# Patient Record
Sex: FEMALE | Race: WHITE | NOT HISPANIC OR LATINO | Employment: OTHER | URBAN - METROPOLITAN AREA
[De-identification: names, ages, dates, MRNs, and addresses within clinical notes are randomized per-mention and may not be internally consistent; named-entity substitution may affect disease eponyms.]

---

## 2017-05-08 ENCOUNTER — APPOINTMENT (OUTPATIENT)
Dept: WOMENS IMAGING | Facility: HOSPITAL | Age: 67
End: 2017-05-08

## 2017-05-08 PROCEDURE — 77063 BREAST TOMOSYNTHESIS BI: CPT | Performed by: RADIOLOGY

## 2017-05-08 PROCEDURE — G0202 SCR MAMMO BI INCL CAD: HCPCS | Performed by: RADIOLOGY

## 2019-05-24 ENCOUNTER — TELEPHONE (OUTPATIENT)
Dept: INTERNAL MEDICINE | Facility: CLINIC | Age: 69
End: 2019-05-24

## 2019-05-24 DIAGNOSIS — Z00.00 HEALTH CARE MAINTENANCE: Primary | ICD-10-CM

## 2019-05-24 DIAGNOSIS — R73.03 PREDIABETES: ICD-10-CM

## 2019-05-28 LAB
ALBUMIN SERPL-MCNC: 4.5 G/DL (ref 3.5–5.2)
ALBUMIN/GLOB SERPL: 2.1 G/DL
ALP SERPL-CCNC: 59 U/L (ref 39–117)
ALT SERPL-CCNC: 30 U/L (ref 1–33)
AST SERPL-CCNC: 27 U/L (ref 1–32)
BASOPHILS # BLD AUTO: 0.07 10*3/MM3 (ref 0–0.2)
BASOPHILS NFR BLD AUTO: 0.8 % (ref 0–1.5)
BILIRUB SERPL-MCNC: 0.3 MG/DL (ref 0.2–1.2)
BUN SERPL-MCNC: 15 MG/DL (ref 8–23)
BUN/CREAT SERPL: 15.5 (ref 7–25)
CALCIUM SERPL-MCNC: 10.2 MG/DL (ref 8.6–10.5)
CHLORIDE SERPL-SCNC: 102 MMOL/L (ref 98–107)
CHOLEST SERPL-MCNC: 105 MG/DL (ref 0–200)
CO2 SERPL-SCNC: 25.3 MMOL/L (ref 22–29)
CREAT SERPL-MCNC: 0.97 MG/DL (ref 0.57–1)
EOSINOPHIL # BLD AUTO: 0.31 10*3/MM3 (ref 0–0.4)
EOSINOPHIL NFR BLD AUTO: 3.6 % (ref 0.3–6.2)
ERYTHROCYTE [DISTWIDTH] IN BLOOD BY AUTOMATED COUNT: 17.1 % (ref 12.3–15.4)
GLOBULIN SER CALC-MCNC: 2.1 GM/DL
GLUCOSE SERPL-MCNC: 155 MG/DL (ref 65–99)
HBA1C MFR BLD: 8.6 % (ref 4.8–5.6)
HCT VFR BLD AUTO: 37.2 % (ref 34–46.6)
HDLC SERPL-MCNC: 36 MG/DL (ref 40–60)
HGB BLD-MCNC: 10.6 G/DL (ref 12–15.9)
IMM GRANULOCYTES # BLD AUTO: 0.04 10*3/MM3 (ref 0–0.05)
IMM GRANULOCYTES NFR BLD AUTO: 0.5 % (ref 0–0.5)
LDLC SERPL CALC-MCNC: 34 MG/DL (ref 0–100)
LYMPHOCYTES # BLD AUTO: 1.77 10*3/MM3 (ref 0.7–3.1)
LYMPHOCYTES NFR BLD AUTO: 20.7 % (ref 19.6–45.3)
MCH RBC QN AUTO: 26.9 PG (ref 26.6–33)
MCHC RBC AUTO-ENTMCNC: 28.5 G/DL (ref 31.5–35.7)
MCV RBC AUTO: 94.4 FL (ref 79–97)
MONOCYTES # BLD AUTO: 0.59 10*3/MM3 (ref 0.1–0.9)
MONOCYTES NFR BLD AUTO: 6.9 % (ref 5–12)
NEUTROPHILS # BLD AUTO: 5.79 10*3/MM3 (ref 1.7–7)
NEUTROPHILS NFR BLD AUTO: 67.5 % (ref 42.7–76)
NRBC BLD AUTO-RTO: 0 /100 WBC (ref 0–0.2)
PLATELET # BLD AUTO: 292 10*3/MM3 (ref 140–450)
POTASSIUM SERPL-SCNC: 4.8 MMOL/L (ref 3.5–5.2)
PROT SERPL-MCNC: 6.6 G/DL (ref 6–8.5)
RBC # BLD AUTO: 3.94 10*6/MM3 (ref 3.77–5.28)
SODIUM SERPL-SCNC: 143 MMOL/L (ref 136–145)
TRIGL SERPL-MCNC: 176 MG/DL (ref 0–150)
TSH SERPL DL<=0.005 MIU/L-ACNC: 1.78 MIU/ML (ref 0.27–4.2)
VLDLC SERPL CALC-MCNC: 35.2 MG/DL
WBC # BLD AUTO: 8.57 10*3/MM3 (ref 3.4–10.8)

## 2019-05-29 LAB
FERRITIN SERPL-MCNC: 81.6 NG/ML (ref 13–150)
Lab: NORMAL
VIT B12 SERPL-MCNC: 464 PG/ML (ref 211–946)
WRITTEN AUTHORIZATION: NORMAL

## 2019-06-04 ENCOUNTER — OFFICE VISIT (OUTPATIENT)
Dept: INTERNAL MEDICINE | Facility: CLINIC | Age: 69
End: 2019-06-04

## 2019-06-04 VITALS
WEIGHT: 273.6 LBS | RESPIRATION RATE: 20 BRPM | HEART RATE: 75 BPM | BODY MASS INDEX: 46.71 KG/M2 | OXYGEN SATURATION: 99 % | HEIGHT: 64 IN | DIASTOLIC BLOOD PRESSURE: 80 MMHG | TEMPERATURE: 98.1 F | SYSTOLIC BLOOD PRESSURE: 114 MMHG

## 2019-06-04 DIAGNOSIS — I10 ESSENTIAL HYPERTENSION: Primary | ICD-10-CM

## 2019-06-04 DIAGNOSIS — E53.8 B12 DEFICIENCY: ICD-10-CM

## 2019-06-04 DIAGNOSIS — E03.8 OTHER SPECIFIED HYPOTHYROIDISM: ICD-10-CM

## 2019-06-04 DIAGNOSIS — D64.9 ANEMIA, UNSPECIFIED TYPE: ICD-10-CM

## 2019-06-04 DIAGNOSIS — E78.5 HYPERLIPIDEMIA, UNSPECIFIED HYPERLIPIDEMIA TYPE: ICD-10-CM

## 2019-06-04 PROCEDURE — 99214 OFFICE O/P EST MOD 30 MIN: CPT | Performed by: INTERNAL MEDICINE

## 2019-06-04 RX ORDER — BENAZEPRIL HYDROCHLORIDE 40 MG/1
40 TABLET, FILM COATED ORAL DAILY
COMMUNITY
End: 2020-06-09 | Stop reason: SDUPTHER

## 2019-06-04 RX ORDER — AMLODIPINE BESYLATE 10 MG/1
10 TABLET ORAL DAILY
COMMUNITY
End: 2020-07-01 | Stop reason: SDUPTHER

## 2019-06-04 RX ORDER — ATORVASTATIN CALCIUM 40 MG/1
40 TABLET, FILM COATED ORAL DAILY
COMMUNITY
End: 2020-03-18

## 2019-06-04 RX ORDER — LANOLIN ALCOHOL/MO/W.PET/CERES
1000 CREAM (GRAM) TOPICAL DAILY
COMMUNITY

## 2019-06-04 RX ORDER — ASPIRIN 81 MG/1
81 TABLET, CHEWABLE ORAL DAILY
COMMUNITY
End: 2021-12-06

## 2019-06-04 RX ORDER — LEVOTHYROXINE SODIUM 112 UG/1
112 TABLET ORAL DAILY
COMMUNITY
End: 2019-12-27 | Stop reason: SDUPTHER

## 2019-06-04 RX ORDER — GLIPIZIDE 10 MG/1
10 TABLET, FILM COATED, EXTENDED RELEASE ORAL 2 TIMES DAILY
COMMUNITY
End: 2021-06-01

## 2019-06-04 RX ORDER — HYDROCHLOROTHIAZIDE 25 MG/1
25 TABLET ORAL DAILY
COMMUNITY
End: 2020-04-09 | Stop reason: SDUPTHER

## 2019-06-04 RX ORDER — ALLOPURINOL 300 MG/1
300 TABLET ORAL DAILY
COMMUNITY
End: 2020-04-09 | Stop reason: SDUPTHER

## 2019-06-04 RX ORDER — LEVOTHYROXINE SODIUM 0.2 MG/1
200 TABLET ORAL DAILY
COMMUNITY
End: 2019-12-27 | Stop reason: SDUPTHER

## 2019-06-04 RX ORDER — METOPROLOL SUCCINATE 50 MG/1
50 TABLET, EXTENDED RELEASE ORAL DAILY
COMMUNITY
End: 2019-06-05 | Stop reason: CLARIF

## 2019-06-04 RX ORDER — OLMESARTAN MEDOXOMIL 40 MG/1
40 TABLET ORAL DAILY
COMMUNITY
End: 2020-07-01 | Stop reason: SDUPTHER

## 2019-06-04 RX ORDER — FOLIC ACID 1 MG/1
1 TABLET ORAL DAILY
COMMUNITY
End: 2019-12-09 | Stop reason: SDUPTHER

## 2019-06-04 NOTE — PROGRESS NOTES
Subjective        Chief Complaint   Patient presents with   • Follow-up     6 month fup lab review    • Anemia   • Hypertension           PHQ-2 Depression Screening  Little interest or pleasure in doing things? 0   Feeling down, depressed, or hopeless? 0   PHQ-2 Total Score 0       Diabetic eye exam 06/03/2019    Lynnette Wright is a 68 y.o. female who presents for    Patient Active Problem List   Diagnosis   • Other specified hypothyroidism   • Anemia   • B12 deficiency   • Hypertension   • Hyperlipidemia       History of Present Illness     She had her a1c drawn last Friday but she does not know the result. She has been checking her BP and it was 121/79 and 118/81. She denies gout, chest pain, dyspnea, melena, hematochezia or abdominal pain. She bought an elliptical and she uses it 3-4 times per week. She has been out of Meloxicam since April; she took it for her knees and it did help.  Allergies   Allergen Reactions   • Contrast Dye Hives       Current Outpatient Medications on File Prior to Visit   Medication Sig Dispense Refill   • allopurinol (ZYLOPRIM) 300 MG tablet Take 300 mg by mouth Daily.     • amLODIPine (NORVASC) 10 MG tablet Take 10 mg by mouth Daily.     • aspirin 81 MG chewable tablet Chew 81 mg Daily.     • atorvastatin (LIPITOR) 40 MG tablet Take 40 mg by mouth Daily.     • benazepril (LOTENSIN) 40 MG tablet Take 40 mg by mouth Daily.     • folic acid (FOLVITE) 1 MG tablet Take 1 mg by mouth Daily.     • glipiZIDE (GLUCOTROL XL) 10 MG 24 hr tablet Take 10 mg by mouth 2 (Two) Times a Day.     • glucose blood test strip 1 each by Other route As Needed. Use as instructed     • hydrochlorothiazide (HYDRODIURIL) 25 MG tablet Take 25 mg by mouth Daily.     • Insulin Glargine (LANTUS SOLOSTAR) 100 UNIT/ML injection pen Inject 52 Units under the skin into the appropriate area as directed Daily.     • Insulin Pen Needle 31G X 5 MM misc      • Lancets 30G misc      • levothyroxine (SYNTHROID, LEVOTHROID) 112  MCG tablet Take 112 mcg by mouth Daily.     • levothyroxine (SYNTHROID, LEVOTHROID) 200 MCG tablet Take 200 mcg by mouth Daily.     • Liraglutide (VICTOZA) 18 MG/3ML solution pen-injector injection Inject 1.2 mg under the skin into the appropriate area as directed Daily.     • metFORMIN (GLUCOPHAGE) 1000 MG tablet Take 1,000 mg by mouth 2 (Two) Times a Day With Meals.     • metoprolol succinate XL (TOPROL-XL) 50 MG 24 hr tablet Take 50 mg by mouth Daily.     • olmesartan (BENICAR) 40 MG tablet Take 40 mg by mouth Daily.     • TURMERIC PO Take  by mouth.     • vitamin B-12 (CYANOCOBALAMIN) 1000 MCG tablet Take 1,000 mcg by mouth Daily.       No current facility-administered medications on file prior to visit.        Past Medical History:   Diagnosis Date   • Allergic    • Anemia    • Arthritis    • B12 deficiency    • Cataract    • Diabetes mellitus (CMS/HCC)    • Diverticulosis    • Fatty liver    • Gout    • Hemangioma    • Hyperlipidemia    • Hypertension    • Lipoma    • LVH (left ventricular hypertrophy)    • Obesity    • ELLA (obstructive sleep apnea)        Past Surgical History:   Procedure Laterality Date   • BREAST BIOPSY     • BREAST SURGERY     • CATARACT EXTRACTION     • CHOLECYSTECTOMY     • COLONOSCOPY  2013   • HYSTERECTOMY     • THYROID SURGERY      entire removal       Family History   Problem Relation Age of Onset   • Hypertension Mother    • Other Mother         fall   • Hypertension Father    • Bone cancer Father    • Lung cancer Father    • Diabetes Brother    • Cancer Brother         sinus   • Hypertension Maternal Grandmother    • Stroke Maternal Grandmother    • Dementia Paternal Grandmother    • Alzheimer's disease Paternal Grandmother    • Stroke Paternal Grandfather        Social History     Socioeconomic History   • Marital status: Unknown     Spouse name: Not on file   • Number of children: Not on file   • Years of education: Not on file   • Highest education level: Not on file   Tobacco  "Use   • Smoking status: Never Smoker   • Smokeless tobacco: Never Used   Substance and Sexual Activity   • Alcohol use: Yes     Frequency: 2-4 times a month     Drinks per session: 1 or 2   • Drug use: No   • Sexual activity: Defer           The following portions of the patient's history were reviewed and updated as appropriate: problem list, allergies, current medications, past medical history, past family history, past social history and past surgical history.    Review of Systems   Respiratory: Negative for shortness of breath.    Cardiovascular: Negative for chest pain.   Gastrointestinal: Negative for abdominal pain and blood in stool.       Immunization History   Administered Date(s) Administered   • Flu Vaccine High Dose PF 65YR+ 10/25/2018   • Hepatitis A 12/12/2017, 06/11/2018   • Hepatitis B 12/12/2017, 06/11/2018   • Pneumococcal Conjugate 13-Valent (PCV13) 05/18/2015   • Pneumococcal Polysaccharide (PPSV23) 01/01/2011   • Tdap 01/01/2013   • Zostavax 01/01/2012       Objective   Vitals:    06/04/19 1111   BP: 114/80   Pulse: 75   Resp: 20   Temp: 98.1 °F (36.7 °C)   TempSrc: Oral   SpO2: 99%   Weight: 124 kg (273 lb 9.6 oz)   Height: 162.6 cm (64\")     Physical Exam   Constitutional: She appears well-developed and well-nourished.   HENT:   Head: Normocephalic and atraumatic.   Cardiovascular: Normal rate, regular rhythm, S1 normal, S2 normal and normal heart sounds.   Pulmonary/Chest: Effort normal and breath sounds normal.   Abdominal: Soft. She exhibits no mass. There is no tenderness.   Neurological: She is alert.   Skin: Skin is warm.   Psychiatric: She has a normal mood and affect.   Vitals reviewed.      Procedures    Assessment/Plan   Lynnette was seen today for follow-up, anemia and hypertension.    Diagnoses and all orders for this visit:    Essential hypertension    Hyperlipidemia, unspecified hyperlipidemia type    B12 deficiency    Other specified hypothyroidism  -     TSH; Future    Anemia, " unspecified type  -     Iron Profile  -     Transferrin  -     Reticulocytes  -     Peripheral Blood Smear  -     Protein Electrophoresis, Random Urine - Urine, Clean Catch  -     Protein Electrophoresis, Total  -     CBC & Differential; Future             She will check with Chris about her third hep B. She is to get her cscope this summer. She is having her MMG tomorrow. I am going to get more blood today to eval her anemia; she has it in the past but her hgb was in the 12s last year. TSH, LDL and BP are at goal. She sees romel for diabetes.    Return in about 4 months (around 10/4/2019), or 30 minutes.

## 2019-06-05 ENCOUNTER — APPOINTMENT (OUTPATIENT)
Dept: WOMENS IMAGING | Facility: HOSPITAL | Age: 69
End: 2019-06-05

## 2019-06-05 ENCOUNTER — TELEPHONE (OUTPATIENT)
Dept: INTERNAL MEDICINE | Facility: CLINIC | Age: 69
End: 2019-06-05

## 2019-06-05 PROCEDURE — 77067 SCR MAMMO BI INCL CAD: CPT | Performed by: RADIOLOGY

## 2019-06-05 PROCEDURE — 77063 BREAST TOMOSYNTHESIS BI: CPT | Performed by: RADIOLOGY

## 2019-06-05 RX ORDER — METOPROLOL SUCCINATE 50 MG/1
50 TABLET, EXTENDED RELEASE ORAL DAILY
Qty: 90 TABLET | Refills: 0 | Status: SHIPPED | OUTPATIENT
Start: 2019-06-05 | End: 2020-03-18

## 2019-06-05 RX ORDER — METOPROLOL TARTRATE 50 MG/1
50 TABLET, FILM COATED ORAL 2 TIMES DAILY
Qty: 180 TABLET | Refills: 1 | Status: SHIPPED | OUTPATIENT
Start: 2019-06-05 | End: 2019-06-05 | Stop reason: CLARIF

## 2019-06-05 RX ORDER — METOPROLOL TARTRATE 50 MG/1
50 TABLET, FILM COATED ORAL 2 TIMES DAILY
COMMUNITY
End: 2019-06-05 | Stop reason: SDUPTHER

## 2019-06-05 NOTE — TELEPHONE ENCOUNTER
Confirm with her which metoprolol she takes and how she takes it since refill request different than what is in chart

## 2019-06-06 NOTE — TELEPHONE ENCOUNTER
Will you please clarify as there are two orders sent in. One was for bid and one for qd. Please ask pt and fix at pharmacy/mail order. Also find out which formulation of metoprolol

## 2019-06-07 ENCOUNTER — TELEPHONE (OUTPATIENT)
Dept: INTERNAL MEDICINE | Facility: CLINIC | Age: 69
End: 2019-06-07

## 2019-06-07 DIAGNOSIS — R92.8 MAMMOGRAM ABNORMAL: Primary | ICD-10-CM

## 2019-06-07 LAB
ALBUMIN SERPL ELPH-MCNC: 3.5 G/DL (ref 2.9–4.4)
ALBUMIN/GLOB SERPL: 1 {RATIO} (ref 0.7–1.7)
ALPHA1 GLOB SERPL ELPH-MCNC: 0.3 G/DL (ref 0–0.4)
ALPHA2 GLOB SERPL ELPH-MCNC: 1.1 G/DL (ref 0.4–1)
B-GLOBULIN SERPL ELPH-MCNC: 1.1 G/DL (ref 0.7–1.3)
BASOPHILS # BLD AUTO: 0.1 X10E3/UL (ref 0–0.2)
BASOPHILS NFR BLD AUTO: 1 %
EOSINOPHIL # BLD AUTO: 0.3 X10E3/UL (ref 0–0.4)
EOSINOPHIL NFR BLD AUTO: 3 %
ERYTHROCYTE [DISTWIDTH] IN BLOOD BY AUTOMATED COUNT: 17.3 % (ref 12.3–15.4)
GAMMA GLOB SERPL ELPH-MCNC: 1.1 G/DL (ref 0.4–1.8)
GLOBULIN SER CALC-MCNC: 3.5 G/DL (ref 2.2–3.9)
HCT VFR BLD AUTO: 36.4 % (ref 34–46.6)
HGB BLD-MCNC: 11.5 G/DL (ref 11.1–15.9)
IMM GRANULOCYTES # BLD AUTO: 0 X10E3/UL (ref 0–0.1)
IMM GRANULOCYTES NFR BLD AUTO: 0 %
IRON SATN MFR SERPL: 12 % (ref 20–50)
IRON SERPL-MCNC: 50 MCG/DL (ref 37–145)
LABORATORY COMMENT REPORT: ABNORMAL
LYMPHOCYTES # BLD AUTO: 2.6 X10E3/UL (ref 0.7–3.1)
LYMPHOCYTES NFR BLD AUTO: 26 %
M PROTEIN SERPL ELPH-MCNC: ABNORMAL G/DL
MCH RBC QN AUTO: 27.5 PG (ref 26.6–33)
MCHC RBC AUTO-ENTMCNC: 31.6 G/DL (ref 31.5–35.7)
MCV RBC AUTO: 87 FL (ref 79–97)
MONOCYTES # BLD AUTO: 0.7 X10E3/UL (ref 0.1–0.9)
MONOCYTES NFR BLD AUTO: 7 %
NEUTROPHILS # BLD AUTO: 6.4 X10E3/UL (ref 1.4–7)
NEUTROPHILS NFR BLD AUTO: 63 %
PATH INTERP BLD-IMP: ABNORMAL
PATHOLOGIST NAME: ABNORMAL
PLATELET # BLD AUTO: 358 X10E3/UL (ref 150–450)
PROT SERPL-MCNC: 7 G/DL (ref 6–8.5)
RBC # BLD AUTO: 4.18 X10E6/UL (ref 3.77–5.28)
RETICS/RBC NFR AUTO: 2.03 % (ref 0.7–1.9)
TIBC SERPL-MCNC: 415 MCG/DL
TRANSFERRIN SERPL-MCNC: 289 MG/DL (ref 200–370)
UIBC SERPL-MCNC: 365 MCG/DL (ref 112–346)
WBC # BLD AUTO: 10 X10E3/UL (ref 3.4–10.8)

## 2019-06-07 NOTE — TELEPHONE ENCOUNTER
She has new calcifications in her left breast. Schedule magnification mammogram at [facility] as that is where she had MMG yesterday. Thank you

## 2019-06-20 ENCOUNTER — APPOINTMENT (OUTPATIENT)
Dept: WOMENS IMAGING | Facility: HOSPITAL | Age: 69
End: 2019-06-20

## 2019-06-20 PROCEDURE — 77065 DX MAMMO INCL CAD UNI: CPT | Performed by: RADIOLOGY

## 2019-06-20 PROCEDURE — MDREVIEWSP: Performed by: RADIOLOGY

## 2019-06-20 PROCEDURE — G0279 TOMOSYNTHESIS, MAMMO: HCPCS | Performed by: RADIOLOGY

## 2019-06-21 DIAGNOSIS — R92.8 ABNORMAL MAMMOGRAM OF LEFT BREAST: Primary | ICD-10-CM

## 2019-06-26 ENCOUNTER — APPOINTMENT (OUTPATIENT)
Dept: WOMENS IMAGING | Facility: HOSPITAL | Age: 69
End: 2019-06-26

## 2019-06-26 PROCEDURE — 19081 BX BREAST 1ST LESION STRTCTC: CPT | Performed by: RADIOLOGY

## 2019-06-28 ENCOUNTER — TELEPHONE (OUTPATIENT)
Dept: INTERNAL MEDICINE | Facility: CLINIC | Age: 69
End: 2019-06-28

## 2019-06-28 ENCOUNTER — RESULTS ENCOUNTER (OUTPATIENT)
Dept: INTERNAL MEDICINE | Facility: CLINIC | Age: 69
End: 2019-06-28

## 2019-06-28 DIAGNOSIS — D64.9 ANEMIA, UNSPECIFIED TYPE: ICD-10-CM

## 2019-06-28 NOTE — TELEPHONE ENCOUNTER
Tell her I reviewed biopsy with path last night and she has a benign fibroadenoma in her left breast. NO evidence of cancer

## 2019-07-01 ENCOUNTER — TELEPHONE (OUTPATIENT)
Dept: INTERNAL MEDICINE | Facility: CLINIC | Age: 69
End: 2019-07-01

## 2019-07-01 NOTE — TELEPHONE ENCOUNTER
Meenakshi with [facility] called to let us know patient has already had a stereotactic biopsy done on 06/26/19 at [facility] and they will cancel the one scheduled for 07/09/19.

## 2019-07-02 DIAGNOSIS — R92.8 ABNORMAL MAMMOGRAM: Primary | ICD-10-CM

## 2019-07-02 DIAGNOSIS — R92.8 FOLLOW-UP EXAMINATION OF ABNORMAL MAMMOGRAM: ICD-10-CM

## 2019-07-03 ENCOUNTER — ANESTHESIA EVENT (OUTPATIENT)
Dept: GASTROENTEROLOGY | Facility: HOSPITAL | Age: 69
End: 2019-07-03

## 2019-07-03 ENCOUNTER — HOSPITAL ENCOUNTER (OUTPATIENT)
Facility: HOSPITAL | Age: 69
Setting detail: HOSPITAL OUTPATIENT SURGERY
Discharge: HOME | End: 2019-07-03
Attending: SURGERY | Admitting: SURGERY

## 2019-07-03 ENCOUNTER — ANESTHESIA (OUTPATIENT)
Dept: GASTROENTEROLOGY | Facility: HOSPITAL | Age: 69
End: 2019-07-03

## 2019-07-03 VITALS
WEIGHT: 272.4 LBS | OXYGEN SATURATION: 96 % | SYSTOLIC BLOOD PRESSURE: 117 MMHG | HEART RATE: 78 BPM | DIASTOLIC BLOOD PRESSURE: 73 MMHG | BODY MASS INDEX: 46.51 KG/M2 | HEIGHT: 64 IN | RESPIRATION RATE: 16 BRPM | TEMPERATURE: 97.9 F

## 2019-07-03 DIAGNOSIS — D64.9 ANEMIA: ICD-10-CM

## 2019-07-03 LAB — GLUCOSE BLDC GLUCOMTR-MCNC: 152 MG/DL (ref 70–130)

## 2019-07-03 PROCEDURE — 25010000002 PROPOFOL 10 MG/ML EMULSION: Performed by: ANESTHESIOLOGY

## 2019-07-03 PROCEDURE — 82962 GLUCOSE BLOOD TEST: CPT

## 2019-07-03 PROCEDURE — 88305 TISSUE EXAM BY PATHOLOGIST: CPT | Performed by: SURGERY

## 2019-07-03 PROCEDURE — 25010000002 PROPOFOL 1000 MG/ML EMULSION: Performed by: ANESTHESIOLOGY

## 2019-07-03 RX ORDER — PROPOFOL 10 MG/ML
VIAL (ML) INTRAVENOUS AS NEEDED
Status: DISCONTINUED | OUTPATIENT
Start: 2019-07-03 | End: 2019-07-03

## 2019-07-03 RX ORDER — PROPOFOL 10 MG/ML
VIAL (ML) INTRAVENOUS AS NEEDED
Status: DISCONTINUED | OUTPATIENT
Start: 2019-07-03 | End: 2019-07-03 | Stop reason: SURG

## 2019-07-03 RX ORDER — SODIUM CHLORIDE, SODIUM LACTATE, POTASSIUM CHLORIDE, CALCIUM CHLORIDE 600; 310; 30; 20 MG/100ML; MG/100ML; MG/100ML; MG/100ML
1000 INJECTION, SOLUTION INTRAVENOUS CONTINUOUS
Status: DISCONTINUED | OUTPATIENT
Start: 2019-07-03 | End: 2019-07-03 | Stop reason: HOSPADM

## 2019-07-03 RX ADMIN — SODIUM CHLORIDE, POTASSIUM CHLORIDE, SODIUM LACTATE AND CALCIUM CHLORIDE 1000 ML: 600; 310; 30; 20 INJECTION, SOLUTION INTRAVENOUS at 12:16

## 2019-07-03 RX ADMIN — PROPOFOL 140 MCG/KG/MIN: 10 INJECTION, EMULSION INTRAVENOUS at 12:37

## 2019-07-03 RX ADMIN — SODIUM CHLORIDE, POTASSIUM CHLORIDE, SODIUM LACTATE AND CALCIUM CHLORIDE 1000 ML: 600; 310; 30; 20 INJECTION, SOLUTION INTRAVENOUS at 12:17

## 2019-07-03 RX ADMIN — PROPOFOL 100 MG: 10 INJECTION, EMULSION INTRAVENOUS at 12:37

## 2019-07-03 NOTE — ANESTHESIA POSTPROCEDURE EVALUATION
"Patient: Lynnette Wright    Procedure Summary     Date:  07/03/19 Room / Location:  [facility] ENDOSCOPY 10 /  [facility]    Anesthesia Start:  1234 Anesthesia Stop:  1259    Procedure:  COLONOSCOPY to cecum with biopsy / polypectomy (N/A ) Diagnosis:      Surgeon:  Royal Zuleta MD Provider:  Cary Encinas MD    Anesthesia Type:  MAC ASA Status:  3          Anesthesia Type: MAC  Last vitals  BP   (!) 89/46 (07/03/19 1300)   Temp   36.6 °C (97.9 °F) (07/03/19 1151)   Pulse   78 (07/03/19 1300)   Resp   14 (07/03/19 1300)     SpO2   96 % (07/03/19 1300)     Post Anesthesia Care and Evaluation    Patient location during evaluation: bedside  Patient participation: complete - patient participated  Level of consciousness: awake  Pain management: adequate  Airway patency: patent  Anesthetic complications: No anesthetic complications  PONV Status: none  Cardiovascular status: acceptable  Respiratory status: acceptable  Hydration status: acceptable    Comments: BP (!) 89/46 (BP Location: Right arm, Patient Position: Lying)   Pulse 78   Temp 36.6 °C (97.9 °F) (Oral)   Resp 14   Ht 162.6 cm (64\")   Wt 124 kg (272 lb 6.4 oz)   SpO2 96%   BMI 46.76 kg/m²         "

## 2019-07-03 NOTE — ANESTHESIA PREPROCEDURE EVALUATION
Anesthesia Evaluation     Patient summary reviewed   NPO Solid Status: > 8 hours  NPO Liquid Status: > 4 hours           Airway   Mallampati: III  TM distance: >3 FB  Dental      Pulmonary    (+) sleep apnea on CPAP,   Cardiovascular     Rhythm: regular  Rate: normal    (+) hypertension 2 medications or greater, valvular problems/murmurs, hyperlipidemia,       Neuro/Psych  GI/Hepatic/Renal/Endo    (+) morbid obesity,  diabetes mellitus using insulin, hypothyroidism,     Musculoskeletal     Abdominal    Substance History      OB/GYN          Other   (+) arthritis                     Anesthesia Plan    ASA 3     MAC   total IV anesthesia  Anesthetic plan, all risks, benefits, and alternatives have been provided, discussed and informed consent has been obtained with: patient.

## 2019-07-03 NOTE — H&P
"Subjective   Lynnette Wright is a 68 y.o. female who presents today for a screening colonoscopy.  On questioning, there are no GI complaints or problems.  Her primary has diagnosed some iron deficiency anemia.  Her  was diagnosed with colon cancer last year so she is \"on high alert.\"      Past Medical History:   Diagnosis Date   • Allergic    • Anemia    • Arthritis    • B12 deficiency    • Cataract    • Diabetes mellitus (CMS/HCC)    • Diverticulosis    • Fatty liver    • Gout    • Heart murmur    • Hemangioma    • Hyperlipidemia    • Hypertension    • Lipoma    • LVH (left ventricular hypertrophy)    • Obesity    • ELLA (obstructive sleep apnea)        PHYSICAL EXAM  Pt is in no distress.  Heart regular.  Chest clear.  Abdomen soft.  Rectal deferred to endoscopy.      Assessment/Plan      Plan a screening colonoscopy today.  Risks and benefits were discussed.  Patient is agreeable.  Final recommendations will follow depending on the results.    Royal Zuleta M.D.      "

## 2019-07-05 LAB
CYTO UR: NORMAL
LAB AP CASE REPORT: NORMAL
PATH REPORT.FINAL DX SPEC: NORMAL
PATH REPORT.GROSS SPEC: NORMAL

## 2019-07-09 ENCOUNTER — HOSPITAL ENCOUNTER (OUTPATIENT)
Dept: MAMMOGRAPHY | Facility: HOSPITAL | Age: 69
End: 2019-07-09

## 2019-10-02 ENCOUNTER — RESULTS ENCOUNTER (OUTPATIENT)
Dept: INTERNAL MEDICINE | Facility: CLINIC | Age: 69
End: 2019-10-02

## 2019-10-02 DIAGNOSIS — E03.8 OTHER SPECIFIED HYPOTHYROIDISM: ICD-10-CM

## 2019-10-07 ENCOUNTER — OFFICE VISIT (OUTPATIENT)
Dept: INTERNAL MEDICINE | Facility: CLINIC | Age: 69
End: 2019-10-07

## 2019-10-07 VITALS
DIASTOLIC BLOOD PRESSURE: 80 MMHG | HEIGHT: 64 IN | BODY MASS INDEX: 45.85 KG/M2 | SYSTOLIC BLOOD PRESSURE: 108 MMHG | WEIGHT: 268.6 LBS

## 2019-10-07 DIAGNOSIS — D64.9 ANEMIA, UNSPECIFIED TYPE: ICD-10-CM

## 2019-10-07 DIAGNOSIS — D36.9 TUBULAR ADENOMA: ICD-10-CM

## 2019-10-07 DIAGNOSIS — E78.5 HYPERLIPIDEMIA, UNSPECIFIED HYPERLIPIDEMIA TYPE: ICD-10-CM

## 2019-10-07 DIAGNOSIS — E66.01 MORBIDLY OBESE (HCC): ICD-10-CM

## 2019-10-07 DIAGNOSIS — E03.8 OTHER SPECIFIED HYPOTHYROIDISM: ICD-10-CM

## 2019-10-07 DIAGNOSIS — I10 ESSENTIAL HYPERTENSION: Primary | ICD-10-CM

## 2019-10-07 LAB
BASOPHILS # BLD AUTO: 0.09 10*3/MM3 (ref 0–0.2)
BASOPHILS NFR BLD AUTO: 0.7 % (ref 0–1.5)
EOSINOPHIL # BLD AUTO: 0.23 10*3/MM3 (ref 0–0.4)
EOSINOPHIL NFR BLD AUTO: 1.8 % (ref 0.3–6.2)
ERYTHROCYTE [DISTWIDTH] IN BLOOD BY AUTOMATED COUNT: 15.8 % (ref 12.3–15.4)
FERRITIN SERPL-MCNC: 78.9 NG/ML (ref 13–150)
HCT VFR BLD AUTO: 39 % (ref 34–46.6)
HGB BLD-MCNC: 11.9 G/DL (ref 12–15.9)
IMM GRANULOCYTES # BLD AUTO: 0.05 10*3/MM3 (ref 0–0.05)
IMM GRANULOCYTES NFR BLD AUTO: 0.4 % (ref 0–0.5)
LYMPHOCYTES # BLD AUTO: 2.64 10*3/MM3 (ref 0.7–3.1)
LYMPHOCYTES NFR BLD AUTO: 20.4 % (ref 19.6–45.3)
MCH RBC QN AUTO: 26.7 PG (ref 26.6–33)
MCHC RBC AUTO-ENTMCNC: 30.5 G/DL (ref 31.5–35.7)
MCV RBC AUTO: 87.4 FL (ref 79–97)
MONOCYTES # BLD AUTO: 0.8 10*3/MM3 (ref 0.1–0.9)
MONOCYTES NFR BLD AUTO: 6.2 % (ref 5–12)
NEUTROPHILS # BLD AUTO: 9.11 10*3/MM3 (ref 1.7–7)
NEUTROPHILS NFR BLD AUTO: 70.5 % (ref 42.7–76)
NRBC BLD AUTO-RTO: 0 /100 WBC (ref 0–0.2)
PLATELET # BLD AUTO: 389 10*3/MM3 (ref 140–450)
RBC # BLD AUTO: 4.46 10*6/MM3 (ref 3.77–5.28)
TSH SERPL DL<=0.005 MIU/L-ACNC: 1.45 UIU/ML (ref 0.27–4.2)
WBC # BLD AUTO: 12.92 10*3/MM3 (ref 3.4–10.8)

## 2019-10-07 PROCEDURE — 99214 OFFICE O/P EST MOD 30 MIN: CPT | Performed by: INTERNAL MEDICINE

## 2019-10-07 PROCEDURE — G0010 ADMIN HEPATITIS B VACCINE: HCPCS | Performed by: INTERNAL MEDICINE

## 2019-10-07 PROCEDURE — 90746 HEPB VACCINE 3 DOSE ADULT IM: CPT | Performed by: INTERNAL MEDICINE

## 2019-10-07 NOTE — PROGRESS NOTES
Subjective        Chief Complaint   Patient presents with   • Follow-up     4 month fup    • Hypertension   • Anemia   • Hypothyroidism           Lynnette Wright is a 68 y.o. female who presents for    Patient Active Problem List   Diagnosis   • Other specified hypothyroidism   • Anemia   • B12 deficiency   • Hypertension   • Hyperlipidemia   • Tubular adenoma   • Morbidly obese (CMS/HCC)       History of Present Illness     She saw endo recently and her a1c was 8.6; she was started on SSI. Her BP has been 105/72. She does the Elliptical 3 days per week. She is not able to walk far b/c of her knees. She has no recent gout. She denies melena, hematochezia, chest pain or dyspnea. She had a cscope with a polyp. She had a breast biopsy that was benign and she has a follow up MMG in December.  Allergies   Allergen Reactions   • Contrast Dye Hives       Current Outpatient Medications on File Prior to Visit   Medication Sig Dispense Refill   • allopurinol (ZYLOPRIM) 300 MG tablet Take 300 mg by mouth Daily.     • amLODIPine (NORVASC) 10 MG tablet Take 10 mg by mouth Daily.     • aspirin 81 MG chewable tablet Chew 81 mg Daily.     • atorvastatin (LIPITOR) 40 MG tablet Take 40 mg by mouth Daily.     • benazepril (LOTENSIN) 40 MG tablet Take 40 mg by mouth Daily.     • folic acid (FOLVITE) 1 MG tablet Take 1 mg by mouth Daily.     • glipiZIDE (GLUCOTROL XL) 10 MG 24 hr tablet Take 10 mg by mouth 2 (Two) Times a Day.     • glucose blood test strip 1 each by Other route As Needed. Use as instructed     • hydrochlorothiazide (HYDRODIURIL) 25 MG tablet Take 25 mg by mouth Daily.     • Insulin Glargine (LANTUS SOLOSTAR) 100 UNIT/ML injection pen Inject 52 Units under the skin into the appropriate area as directed Daily.     • Insulin Pen Needle 31G X 5 MM misc      • Lancets 30G misc      • levothyroxine (SYNTHROID, LEVOTHROID) 112 MCG tablet Take 112 mcg by mouth Daily.     • levothyroxine (SYNTHROID, LEVOTHROID) 200 MCG tablet Take  200 mcg by mouth Daily.     • Liraglutide (VICTOZA) 18 MG/3ML solution pen-injector injection Inject 1.2 mg under the skin into the appropriate area as directed Daily.     • metFORMIN (GLUCOPHAGE) 1000 MG tablet Take 1,000 mg by mouth 2 (Two) Times a Day With Meals.     • metoprolol succinate XL (TOPROL-XL) 50 MG 24 hr tablet Take 1 tablet by mouth Daily. 90 tablet 0   • olmesartan (BENICAR) 40 MG tablet Take 40 mg by mouth Daily.     • TURMERIC PO Take  by mouth.     • vitamin B-12 (CYANOCOBALAMIN) 1000 MCG tablet Take 1,000 mcg by mouth Daily.       No current facility-administered medications on file prior to visit.        Past Medical History:   Diagnosis Date   • Allergic    • Allergic rhinitis    • Anemia    • Arthritis    • B12 deficiency    • Cataract    • Diverticulosis    • Fatty liver    • Gout    • Heart murmur    • Hemangioma    • Hyperlipidemia    • Lipoma    • LVH (left ventricular hypertrophy)    • Obesity    • ELLA (obstructive sleep apnea)    • Type II diabetes mellitus (CMS/HCC)        Past Surgical History:   Procedure Laterality Date   • BREAST BIOPSY     • BREAST LUMPECTOMY     • BREAST SURGERY     • CATARACT EXTRACTION     • CHOLECYSTECTOMY     • COLONOSCOPY  2013   • COLONOSCOPY N/A 7/3/2019    Procedure: COLONOSCOPY to cecum with biopsy / polypectomy;  Surgeon: Royal Zuleta MD;  Location: [facility] ENDOSCOPY;  Service: General   • GALLBLADDER SURGERY     • HYSTERECTOMY  1998    FOR BLEEDING   • THYROID SURGERY      nodule removed left thyroid   • THYROIDECTOMY  1998    total thyroidectomy       Family History   Problem Relation Age of Onset   • Hypertension Mother    • Other Mother         fall   • Hypertension Father    • Bone cancer Father    • Lung cancer Father    • Diabetes Brother    • Cancer Brother         sinus   • Hypertension Maternal Grandmother    • Stroke Maternal Grandmother    • Dementia Paternal Grandmother    • Alzheimer's disease Paternal Grandmother    • Stroke Paternal  "Grandfather        Social History     Socioeconomic History   • Marital status: Unknown     Spouse name: Not on file   • Number of children: Not on file   • Years of education: Not on file   • Highest education level: Not on file   Tobacco Use   • Smoking status: Never Smoker   • Smokeless tobacco: Never Used   Substance and Sexual Activity   • Alcohol use: Yes     Frequency: 2-4 times a month     Drinks per session: 1 or 2     Comment: twice a month   • Drug use: No   • Sexual activity: Defer           The following portions of the patient's history were reviewed and updated as appropriate: problem list, allergies, current medications, past medical history, past family history, past social history and past surgical history.    Review of Systems   Respiratory: Negative for shortness of breath.    Cardiovascular: Negative for chest pain.       Immunization History   Administered Date(s) Administered   • Flu Vaccine High Dose PF 65YR+ 10/25/2018, 10/03/2019   • Hepatitis A 12/12/2017, 06/11/2018   • Hepatitis B 12/12/2017, 06/11/2018   • Pneumococcal Conjugate 13-Valent (PCV13) 05/18/2015   • Pneumococcal Polysaccharide (PPSV23) 01/01/2011   • Tdap 01/01/2013   • Zostavax 01/01/2012       Objective   Vitals:    10/07/19 0943   BP: 108/80   Weight: 122 kg (268 lb 9.6 oz)   Height: 162.6 cm (64\")     Physical Exam   Constitutional: She appears well-developed and well-nourished.   HENT:   Head: Normocephalic and atraumatic.   Cardiovascular: Normal rate, regular rhythm, S1 normal, S2 normal and normal heart sounds.   Pulmonary/Chest: Effort normal and breath sounds normal.   Neurological: She is alert.   Skin: Skin is warm.   Psychiatric: She has a normal mood and affect.   Vitals reviewed.      Procedures    Assessment/Plan   Lynnette was seen today for follow-up, hypertension, anemia and hypothyroidism.    Diagnoses and all orders for this visit:    Essential hypertension  -     Comprehensive Metabolic Panel; " Future    Other specified hypothyroidism  -     TSH  -     TSH; Future    Hyperlipidemia, unspecified hyperlipidemia type  -     Lipid Panel With / Chol / HDL Ratio; Future    Anemia, unspecified type  -     CBC & Differential  -     Ferritin  -     CBC & Differential; Future    Tubular adenoma    Morbidly obese (CMS/HCC)    Other orders  -     Hepatitis B Vaccine Adult IM             Labs today. Last hep B shot for fatty liver. Repeat MMG in December. BP is good. Repeat CBC. Cscope reviewed. Recc wt loss.    Return in about 6 months (around 4/7/2020).

## 2019-12-03 ENCOUNTER — OFFICE VISIT (OUTPATIENT)
Dept: INTERNAL MEDICINE | Facility: CLINIC | Age: 69
End: 2019-12-03

## 2019-12-03 ENCOUNTER — TELEPHONE (OUTPATIENT)
Dept: INTERNAL MEDICINE | Facility: CLINIC | Age: 69
End: 2019-12-03

## 2019-12-03 VITALS
WEIGHT: 270 LBS | BODY MASS INDEX: 46.1 KG/M2 | SYSTOLIC BLOOD PRESSURE: 120 MMHG | HEIGHT: 64 IN | DIASTOLIC BLOOD PRESSURE: 70 MMHG

## 2019-12-03 DIAGNOSIS — M10.9 ACUTE GOUT OF RIGHT FOOT, UNSPECIFIED CAUSE: Primary | ICD-10-CM

## 2019-12-03 DIAGNOSIS — R52 PAIN: Primary | ICD-10-CM

## 2019-12-03 LAB — URATE SERPL-MCNC: 3.3 MG/DL (ref 2.4–5.7)

## 2019-12-03 PROCEDURE — 99213 OFFICE O/P EST LOW 20 MIN: CPT | Performed by: PHYSICIAN ASSISTANT

## 2019-12-03 RX ORDER — COLCHICINE 0.6 MG/1
TABLET ORAL
Qty: 30 TABLET | Refills: 0 | Status: SHIPPED | OUTPATIENT
Start: 2019-12-03 | End: 2020-07-01 | Stop reason: SDUPTHER

## 2019-12-04 ENCOUNTER — HOSPITAL ENCOUNTER (OUTPATIENT)
Dept: GENERAL RADIOLOGY | Facility: HOSPITAL | Age: 69
Discharge: HOME | End: 2019-12-04
Admitting: INTERNAL MEDICINE

## 2019-12-04 PROCEDURE — 73630 X-RAY EXAM OF FOOT: CPT

## 2019-12-09 RX ORDER — FOLIC ACID 1 MG/1
1 TABLET ORAL DAILY
Qty: 90 TABLET | Refills: 1 | Status: SHIPPED | OUTPATIENT
Start: 2019-12-09 | End: 2020-06-09

## 2019-12-27 RX ORDER — LEVOTHYROXINE SODIUM 0.2 MG/1
200 TABLET ORAL DAILY
Qty: 90 TABLET | Refills: 1 | Status: SHIPPED | OUTPATIENT
Start: 2019-12-27 | End: 2020-07-01 | Stop reason: SDUPTHER

## 2019-12-27 RX ORDER — LEVOTHYROXINE SODIUM 112 UG/1
112 TABLET ORAL DAILY
Qty: 90 TABLET | Refills: 1 | Status: SHIPPED | OUTPATIENT
Start: 2019-12-27 | End: 2020-07-01 | Stop reason: SDUPTHER

## 2020-03-18 RX ORDER — ATORVASTATIN CALCIUM 40 MG/1
TABLET, FILM COATED ORAL
Qty: 90 TABLET | Refills: 0 | Status: SHIPPED | OUTPATIENT
Start: 2020-03-18 | End: 2020-07-01 | Stop reason: SDUPTHER

## 2020-03-18 RX ORDER — METOPROLOL SUCCINATE 50 MG/1
TABLET, EXTENDED RELEASE ORAL
Qty: 90 TABLET | Refills: 0 | Status: SHIPPED | OUTPATIENT
Start: 2020-03-18 | End: 2020-06-09

## 2020-04-07 ENCOUNTER — RESULTS ENCOUNTER (OUTPATIENT)
Dept: INTERNAL MEDICINE | Facility: CLINIC | Age: 70
End: 2020-04-07

## 2020-04-07 DIAGNOSIS — E03.8 OTHER SPECIFIED HYPOTHYROIDISM: ICD-10-CM

## 2020-04-07 DIAGNOSIS — I10 ESSENTIAL HYPERTENSION: ICD-10-CM

## 2020-04-07 DIAGNOSIS — E78.5 HYPERLIPIDEMIA, UNSPECIFIED HYPERLIPIDEMIA TYPE: ICD-10-CM

## 2020-04-07 DIAGNOSIS — D64.9 ANEMIA, UNSPECIFIED TYPE: ICD-10-CM

## 2020-04-09 DIAGNOSIS — I10 ESSENTIAL HYPERTENSION: Primary | ICD-10-CM

## 2020-04-09 DIAGNOSIS — M10.9 ACUTE GOUT OF RIGHT FOOT, UNSPECIFIED CAUSE: ICD-10-CM

## 2020-04-09 RX ORDER — HYDROCHLOROTHIAZIDE 25 MG/1
25 TABLET ORAL DAILY
Qty: 90 TABLET | Refills: 0 | Status: SHIPPED | OUTPATIENT
Start: 2020-04-09 | End: 2020-07-01 | Stop reason: SDUPTHER

## 2020-04-09 RX ORDER — ALLOPURINOL 300 MG/1
300 TABLET ORAL DAILY
Qty: 90 TABLET | Refills: 0 | Status: SHIPPED | OUTPATIENT
Start: 2020-04-09 | End: 2020-07-01 | Stop reason: SDUPTHER

## 2020-04-09 NOTE — TELEPHONE ENCOUNTER
Pt insurance has changed from [employer] to [employer].  She is needing just these two rx's at this time

## 2020-06-09 DIAGNOSIS — I10 ESSENTIAL HYPERTENSION: Primary | ICD-10-CM

## 2020-06-09 DIAGNOSIS — D64.9 ANEMIA, UNSPECIFIED TYPE: ICD-10-CM

## 2020-06-09 RX ORDER — FOLIC ACID 1 MG/1
1 TABLET ORAL DAILY
Qty: 90 TABLET | Refills: 1 | Status: SHIPPED | OUTPATIENT
Start: 2020-06-09 | End: 2020-07-01 | Stop reason: SDUPTHER

## 2020-06-09 RX ORDER — METOPROLOL SUCCINATE 50 MG/1
TABLET, EXTENDED RELEASE ORAL
Qty: 90 TABLET | Refills: 0 | Status: SHIPPED | OUTPATIENT
Start: 2020-06-09 | End: 2020-07-01 | Stop reason: SDUPTHER

## 2020-06-09 RX ORDER — BENAZEPRIL HYDROCHLORIDE 40 MG/1
40 TABLET, FILM COATED ORAL DAILY
Qty: 90 TABLET | Refills: 0 | Status: SHIPPED | OUTPATIENT
Start: 2020-06-09 | End: 2020-07-01 | Stop reason: SDUPTHER

## 2020-06-22 ENCOUNTER — APPOINTMENT (OUTPATIENT)
Dept: WOMENS IMAGING | Facility: HOSPITAL | Age: 70
End: 2020-06-22

## 2020-06-22 LAB
ALBUMIN SERPL-MCNC: 4.3 G/DL (ref 3.5–5.2)
ALBUMIN/GLOB SERPL: 1.7 G/DL
ALP SERPL-CCNC: 62 U/L (ref 39–117)
ALT SERPL-CCNC: 24 U/L (ref 1–33)
AST SERPL-CCNC: 14 U/L (ref 1–32)
BASOPHILS # BLD AUTO: 0.08 10*3/MM3 (ref 0–0.2)
BASOPHILS NFR BLD AUTO: 0.7 % (ref 0–1.5)
BILIRUB SERPL-MCNC: 0.3 MG/DL (ref 0.2–1.2)
BUN SERPL-MCNC: 22 MG/DL (ref 8–23)
BUN/CREAT SERPL: 19.3 (ref 7–25)
CALCIUM SERPL-MCNC: 9.8 MG/DL (ref 8.6–10.5)
CHLORIDE SERPL-SCNC: 105 MMOL/L (ref 98–107)
CHOLEST SERPL-MCNC: 108 MG/DL (ref 0–200)
CHOLEST/HDLC SERPL: 3.18 {RATIO}
CO2 SERPL-SCNC: 22.9 MMOL/L (ref 22–29)
CREAT SERPL-MCNC: 1.14 MG/DL (ref 0.57–1)
EOSINOPHIL # BLD AUTO: 0.18 10*3/MM3 (ref 0–0.4)
EOSINOPHIL NFR BLD AUTO: 1.7 % (ref 0.3–6.2)
ERYTHROCYTE [DISTWIDTH] IN BLOOD BY AUTOMATED COUNT: 16.2 % (ref 12.3–15.4)
GLOBULIN SER CALC-MCNC: 2.6 GM/DL
GLUCOSE SERPL-MCNC: 130 MG/DL (ref 65–99)
HCT VFR BLD AUTO: 35.4 % (ref 34–46.6)
HDLC SERPL-MCNC: 34 MG/DL (ref 40–60)
HGB BLD-MCNC: 11.3 G/DL (ref 12–15.9)
IMM GRANULOCYTES # BLD AUTO: 0.05 10*3/MM3 (ref 0–0.05)
IMM GRANULOCYTES NFR BLD AUTO: 0.5 % (ref 0–0.5)
LDLC SERPL CALC-MCNC: 28 MG/DL (ref 0–100)
LYMPHOCYTES # BLD AUTO: 2.61 10*3/MM3 (ref 0.7–3.1)
LYMPHOCYTES NFR BLD AUTO: 24 % (ref 19.6–45.3)
MCH RBC QN AUTO: 27.4 PG (ref 26.6–33)
MCHC RBC AUTO-ENTMCNC: 31.9 G/DL (ref 31.5–35.7)
MCV RBC AUTO: 85.9 FL (ref 79–97)
MONOCYTES # BLD AUTO: 0.74 10*3/MM3 (ref 0.1–0.9)
MONOCYTES NFR BLD AUTO: 6.8 % (ref 5–12)
NEUTROPHILS # BLD AUTO: 7.2 10*3/MM3 (ref 1.7–7)
NEUTROPHILS NFR BLD AUTO: 66.3 % (ref 42.7–76)
NRBC BLD AUTO-RTO: 0 /100 WBC (ref 0–0.2)
PLATELET # BLD AUTO: 336 10*3/MM3 (ref 140–450)
POTASSIUM SERPL-SCNC: 4.5 MMOL/L (ref 3.5–5.2)
PROT SERPL-MCNC: 6.9 G/DL (ref 6–8.5)
RBC # BLD AUTO: 4.12 10*6/MM3 (ref 3.77–5.28)
SODIUM SERPL-SCNC: 142 MMOL/L (ref 136–145)
TRIGL SERPL-MCNC: 231 MG/DL (ref 0–150)
TSH SERPL DL<=0.005 MIU/L-ACNC: 0.69 UIU/ML (ref 0.27–4.2)
VLDLC SERPL CALC-MCNC: 46.2 MG/DL
WBC # BLD AUTO: 10.86 10*3/MM3 (ref 3.4–10.8)

## 2020-06-22 PROCEDURE — G0279 TOMOSYNTHESIS, MAMMO: HCPCS | Performed by: RADIOLOGY

## 2020-06-22 PROCEDURE — 77066 DX MAMMO INCL CAD BI: CPT | Performed by: RADIOLOGY

## 2020-07-01 ENCOUNTER — OFFICE VISIT (OUTPATIENT)
Dept: INTERNAL MEDICINE | Facility: CLINIC | Age: 70
End: 2020-07-01

## 2020-07-01 VITALS
WEIGHT: 263 LBS | BODY MASS INDEX: 44.9 KG/M2 | SYSTOLIC BLOOD PRESSURE: 112 MMHG | TEMPERATURE: 98 F | DIASTOLIC BLOOD PRESSURE: 78 MMHG | HEIGHT: 64 IN

## 2020-07-01 DIAGNOSIS — I10 ESSENTIAL HYPERTENSION: ICD-10-CM

## 2020-07-01 DIAGNOSIS — M10.9 ACUTE GOUT OF RIGHT FOOT, UNSPECIFIED CAUSE: ICD-10-CM

## 2020-07-01 DIAGNOSIS — E78.5 HYPERLIPIDEMIA, UNSPECIFIED HYPERLIPIDEMIA TYPE: ICD-10-CM

## 2020-07-01 DIAGNOSIS — E03.8 OTHER SPECIFIED HYPOTHYROIDISM: ICD-10-CM

## 2020-07-01 DIAGNOSIS — D64.9 ANEMIA, UNSPECIFIED TYPE: Primary | ICD-10-CM

## 2020-07-01 DIAGNOSIS — D64.9 ANEMIA, UNSPECIFIED TYPE: ICD-10-CM

## 2020-07-01 DIAGNOSIS — E78.5 HYPERLIPIDEMIA, UNSPECIFIED HYPERLIPIDEMIA TYPE: Primary | ICD-10-CM

## 2020-07-01 PROCEDURE — 99214 OFFICE O/P EST MOD 30 MIN: CPT | Performed by: INTERNAL MEDICINE

## 2020-07-01 RX ORDER — ALLOPURINOL 300 MG/1
300 TABLET ORAL DAILY
Qty: 90 TABLET | Refills: 1 | Status: SHIPPED | OUTPATIENT
Start: 2020-07-01 | End: 2021-06-22

## 2020-07-01 RX ORDER — BENAZEPRIL HYDROCHLORIDE 40 MG/1
40 TABLET, FILM COATED ORAL DAILY
Qty: 90 TABLET | Refills: 1 | Status: SHIPPED | OUTPATIENT
Start: 2020-07-01 | End: 2021-11-12

## 2020-07-01 RX ORDER — FOLIC ACID 1 MG/1
1 TABLET ORAL DAILY
Qty: 90 TABLET | Refills: 1 | Status: SHIPPED | OUTPATIENT
Start: 2020-07-01 | End: 2020-12-28

## 2020-07-01 RX ORDER — METOPROLOL SUCCINATE 50 MG/1
50 TABLET, EXTENDED RELEASE ORAL DAILY
Qty: 90 TABLET | Refills: 1 | Status: SHIPPED | OUTPATIENT
Start: 2020-07-01 | End: 2021-03-10

## 2020-07-01 RX ORDER — COLCHICINE 0.6 MG/1
TABLET ORAL
Qty: 90 TABLET | Refills: 1 | Status: SHIPPED | OUTPATIENT
Start: 2020-07-01 | End: 2020-12-03 | Stop reason: SDUPTHER

## 2020-07-01 RX ORDER — OLMESARTAN MEDOXOMIL 40 MG/1
40 TABLET ORAL DAILY
Qty: 90 TABLET | Refills: 1 | Status: SHIPPED | OUTPATIENT
Start: 2020-07-01 | End: 2020-12-03 | Stop reason: SDUPTHER

## 2020-07-01 RX ORDER — HYDROCHLOROTHIAZIDE 25 MG/1
25 TABLET ORAL DAILY
Qty: 90 TABLET | Refills: 1 | Status: SHIPPED | OUTPATIENT
Start: 2020-07-01 | End: 2021-06-22

## 2020-07-01 RX ORDER — ATORVASTATIN CALCIUM 40 MG/1
40 TABLET, FILM COATED ORAL DAILY
Qty: 90 TABLET | Refills: 1 | Status: SHIPPED | OUTPATIENT
Start: 2020-07-01 | End: 2020-12-28

## 2020-07-01 RX ORDER — LEVOTHYROXINE SODIUM 112 UG/1
112 TABLET ORAL DAILY
Qty: 90 TABLET | Refills: 1 | Status: SHIPPED | OUTPATIENT
Start: 2020-07-01 | End: 2020-12-28

## 2020-07-01 RX ORDER — AMLODIPINE BESYLATE 10 MG/1
10 TABLET ORAL DAILY
Qty: 90 TABLET | Refills: 1 | Status: SHIPPED | OUTPATIENT
Start: 2020-07-01 | End: 2020-12-03 | Stop reason: SDUPTHER

## 2020-07-01 RX ORDER — LEVOTHYROXINE SODIUM 0.2 MG/1
200 TABLET ORAL DAILY
Qty: 90 TABLET | Refills: 1 | Status: SHIPPED | OUTPATIENT
Start: 2020-07-01 | End: 2020-12-28

## 2020-07-01 NOTE — PROGRESS NOTES
Subjective        Chief Complaint   Patient presents with   • Hypertension   • Hyperlipidemia   • Anemia           Lynnette Wright is a 69 y.o. female who presents for    Patient Active Problem List   Diagnosis   • Other specified hypothyroidism   • Anemia   • B12 deficiency   • Hypertension   • Hyperlipidemia   • Tubular adenoma   • Morbidly obese (CMS/HCC)       History of Present Illness     She has not been checking her BP. She denies melena, hematochezia, chest pain or dyspnea. She is using her elliptical three days per week. She has not seen endo yet. She had a gout flare in May in her right ankle for two days; she is not sure what  triggered it. She feels better now than she has in a long time.  Allergies   Allergen Reactions   • Contrast Dye Hives       Current Outpatient Medications on File Prior to Visit   Medication Sig Dispense Refill   • aspirin 81 MG chewable tablet Chew 81 mg Daily.     • glipiZIDE (GLUCOTROL XL) 10 MG 24 hr tablet Take 10 mg by mouth 2 (Two) Times a Day.     • glucose blood test strip 1 each by Other route As Needed. Use as instructed     • Insulin Aspart (NOVOLOG FLEXPEN SC) Inject  under the skin into the appropriate area as directed. 13 units in the am and 16u in the PM     • Insulin Glargine (LANTUS SOLOSTAR) 100 UNIT/ML injection pen Inject 52 Units under the skin into the appropriate area as directed Daily.     • Insulin Pen Needle 31G X 5 MM misc      • Lancets 30G misc      • Liraglutide (VICTOZA) 18 MG/3ML solution pen-injector injection Inject 1.2 mg under the skin into the appropriate area as directed Daily.     • metFORMIN (GLUCOPHAGE) 1000 MG tablet Take 1,000 mg by mouth 2 (Two) Times a Day With Meals.     • TURMERIC PO Take  by mouth.     • vitamin B-12 (CYANOCOBALAMIN) 1000 MCG tablet Take 1,000 mcg by mouth Daily.     • [DISCONTINUED] allopurinol (ZYLOPRIM) 300 MG tablet Take 1 tablet by mouth Daily. 90 tablet 0   • [DISCONTINUED] amLODIPine (NORVASC) 10 MG tablet Take  10 mg by mouth Daily.     • [DISCONTINUED] atorvastatin (LIPITOR) 40 MG tablet TAKE 1 TABLET BY MOUTH DAILY 90 tablet 0   • [DISCONTINUED] benazepril (LOTENSIN) 40 MG tablet Take 1 tablet by mouth Daily. 90 tablet 0   • [DISCONTINUED] colchicine 0.6 MG tablet Take 2 tablets once and 1 tablet 1 hr later. Then 1 po until sx resolve. 30 tablet 0   • [DISCONTINUED] folic acid (FOLVITE) 1 MG tablet TAKE 1 TABLET BY MOUTH DAILY 90 tablet 1   • [DISCONTINUED] hydroCHLOROthiazide (HYDRODIURIL) 25 MG tablet Take 1 tablet by mouth Daily. 90 tablet 0   • [DISCONTINUED] levothyroxine (SYNTHROID, LEVOTHROID) 112 MCG tablet Take 1 tablet by mouth Daily. 90 tablet 1   • [DISCONTINUED] levothyroxine (SYNTHROID, LEVOTHROID) 200 MCG tablet Take 1 tablet by mouth Daily. 90 tablet 1   • [DISCONTINUED] metoprolol succinate XL (TOPROL-XL) 50 MG 24 hr tablet TAKE 1 TABLET BY MOUTH EVERY DAY 90 tablet 0   • [DISCONTINUED] olmesartan (BENICAR) 40 MG tablet Take 40 mg by mouth Daily.       No current facility-administered medications on file prior to visit.        Past Medical History:   Diagnosis Date   • Allergic    • Anemia    • B12 deficiency    • Cataract    • Diverticulosis    • Fatty liver    • Gout    • Heart murmur    • Hemangioma    • Hyperlipidemia    • Lipoma    • LVH (left ventricular hypertrophy)    • Obesity    • ELLA (obstructive sleep apnea)    • Type II diabetes mellitus (CMS/HCC)        Past Surgical History:   Procedure Laterality Date   • BREAST BIOPSY     • BREAST LUMPECTOMY     • BREAST SURGERY     • CATARACT EXTRACTION     • CHOLECYSTECTOMY     • COLONOSCOPY  2013   • COLONOSCOPY N/A 7/3/2019    Procedure: COLONOSCOPY to cecum with biopsy / polypectomy;  Surgeon: Royal Zuleta MD;  Location: [facility] ENDOSCOPY;  Service: General   • GALLBLADDER SURGERY     • HYSTERECTOMY  1998    FOR BLEEDING   • THYROID SURGERY      nodule removed left thyroid   • THYROIDECTOMY  1998    total thyroidectomy       Family History  "  Problem Relation Age of Onset   • Hypertension Mother    • Other Mother         fall   • Hypertension Father    • Bone cancer Father    • Lung cancer Father    • Diabetes Brother    • Cancer Brother         sinus   • Hypertension Maternal Grandmother    • Stroke Maternal Grandmother    • Dementia Paternal Grandmother    • Alzheimer's disease Paternal Grandmother    • Stroke Paternal Grandfather        Social History     Socioeconomic History   • Marital status:      Spouse name: Not on file   • Number of children: Not on file   • Years of education: Not on file   • Highest education level: Not on file   Tobacco Use   • Smoking status: Never Smoker   • Smokeless tobacco: Never Used   Substance and Sexual Activity   • Alcohol use: Yes     Frequency: 2-4 times a month     Drinks per session: 1 or 2     Comment: twice a month   • Drug use: No   • Sexual activity: Defer           The following portions of the patient's history were reviewed and updated as appropriate: problem list, allergies, current medications, past medical history, past family history, past social history and past surgical history.    Review of Systems   Eyes: Negative for blurred vision.   Respiratory: Negative for shortness of breath.    Cardiovascular: Negative for chest pain and palpitations.   Musculoskeletal: Negative for neck pain.       Immunization History   Administered Date(s) Administered   • Fluzone High Dose =>65 Years ([employer]ax[employer] ONLY) 10/25/2018, 10/03/2019   • Hepatitis A 12/12/2017, 06/11/2018   • Hepatitis B 12/12/2017, 06/11/2018   • Hepatitis B Vaccine Adult IM 10/07/2019   • Pneumococcal Conjugate 13-Valent (PCV13) 05/18/2015   • Pneumococcal Polysaccharide (PPSV23) 01/01/2011   • Tdap 01/01/2013   • Zostavax 01/01/2012       Objective   Vitals:    07/01/20 1110   BP: 112/78   Temp: 98 °F (36.7 °C)   Weight: 119 kg (263 lb)   Height: 162.6 cm (64\")     Body mass index is 45.14 kg/m².  Physical Exam   Constitutional: She " appears well-developed and well-nourished.   HENT:   Head: Normocephalic and atraumatic.   Cardiovascular: Normal rate, regular rhythm, S1 normal, S2 normal and normal heart sounds.   Pulmonary/Chest: Effort normal and breath sounds normal.   Neurological: She is alert.   Skin: Skin is warm.   Psychiatric: She has a normal mood and affect.   Vitals reviewed.      Procedures    Assessment/Plan   Lynnette was seen today for hypertension, hyperlipidemia and anemia.    Diagnoses and all orders for this visit:    Anemia, unspecified type  -     CBC & Differential; Future  -     Protein Electrophoresis, Random Urine - Urine, Clean Catch; Future    Essential hypertension  -     Basic Metabolic Panel; Future    Other specified hypothyroidism  -     TSH; Future    Hyperlipidemia, unspecified hyperlipidemia type               LDL, TSH and BP are at goal. She sees [facility] for [facility] DM in August. Her anemia is stable and it has been worked up here and at [facility].  Return in about 5 months (around 12/1/2020) for Medicare Wellness.  Answers for HPI/ROS submitted by the patient on 6/29/2020   Hypertension  What is the primary reason for your visit?: High Blood Pressure

## 2020-11-23 DIAGNOSIS — D64.9 ANEMIA, UNSPECIFIED TYPE: ICD-10-CM

## 2020-11-23 DIAGNOSIS — E03.8 OTHER SPECIFIED HYPOTHYROIDISM: ICD-10-CM

## 2020-11-23 DIAGNOSIS — I10 ESSENTIAL HYPERTENSION: ICD-10-CM

## 2020-11-27 LAB
ALBUMIN MFR UR ELPH: 36.1 %
ALPHA1 GLOB MFR UR ELPH: 1.8 %
ALPHA2 GLOB MFR UR ELPH: 12.3 %
B-GLOBULIN MFR UR ELPH: 23.7 %
BASOPHILS # BLD AUTO: 0.1 10*3/MM3 (ref 0–0.2)
BASOPHILS NFR BLD AUTO: 1 % (ref 0–1.5)
BUN SERPL-MCNC: 16 MG/DL (ref 8–23)
BUN/CREAT SERPL: 16.2 (ref 7–25)
CALCIUM SERPL-MCNC: 9.8 MG/DL (ref 8.6–10.5)
CHLORIDE SERPL-SCNC: 102 MMOL/L (ref 98–107)
CO2 SERPL-SCNC: 26.1 MMOL/L (ref 22–29)
CREAT SERPL-MCNC: 0.99 MG/DL (ref 0.57–1)
EOSINOPHIL # BLD AUTO: 0.31 10*3/MM3 (ref 0–0.4)
EOSINOPHIL NFR BLD AUTO: 3.1 % (ref 0.3–6.2)
ERYTHROCYTE [DISTWIDTH] IN BLOOD BY AUTOMATED COUNT: 15.5 % (ref 12.3–15.4)
GAMMA GLOB MFR UR ELPH: 26.1 %
GLUCOSE SERPL-MCNC: 200 MG/DL (ref 65–99)
HCT VFR BLD AUTO: 38.3 % (ref 34–46.6)
HGB BLD-MCNC: 12.1 G/DL (ref 12–15.9)
IMM GRANULOCYTES # BLD AUTO: 0.07 10*3/MM3 (ref 0–0.05)
IMM GRANULOCYTES NFR BLD AUTO: 0.7 % (ref 0–0.5)
LABORATORY COMMENT REPORT: NORMAL
LYMPHOCYTES # BLD AUTO: 2.56 10*3/MM3 (ref 0.7–3.1)
LYMPHOCYTES NFR BLD AUTO: 26 % (ref 19.6–45.3)
M PROTEIN MFR UR ELPH: NORMAL %
MCH RBC QN AUTO: 27.3 PG (ref 26.6–33)
MCHC RBC AUTO-ENTMCNC: 31.6 G/DL (ref 31.5–35.7)
MCV RBC AUTO: 86.3 FL (ref 79–97)
MONOCYTES # BLD AUTO: 0.66 10*3/MM3 (ref 0.1–0.9)
MONOCYTES NFR BLD AUTO: 6.7 % (ref 5–12)
NEUTROPHILS # BLD AUTO: 6.15 10*3/MM3 (ref 1.7–7)
NEUTROPHILS NFR BLD AUTO: 62.5 % (ref 42.7–76)
NRBC BLD AUTO-RTO: 0 /100 WBC (ref 0–0.2)
PLATELET # BLD AUTO: 344 10*3/MM3 (ref 140–450)
POTASSIUM SERPL-SCNC: 4.2 MMOL/L (ref 3.5–5.2)
PROT UR-MCNC: 41.8 MG/DL
RBC # BLD AUTO: 4.44 10*6/MM3 (ref 3.77–5.28)
SODIUM SERPL-SCNC: 140 MMOL/L (ref 136–145)
TSH SERPL DL<=0.005 MIU/L-ACNC: 1.39 UIU/ML (ref 0.27–4.2)
WBC # BLD AUTO: 9.85 10*3/MM3 (ref 3.4–10.8)

## 2020-12-03 ENCOUNTER — OFFICE VISIT (OUTPATIENT)
Dept: INTERNAL MEDICINE | Facility: CLINIC | Age: 70
End: 2020-12-03

## 2020-12-03 VITALS
WEIGHT: 272 LBS | SYSTOLIC BLOOD PRESSURE: 114 MMHG | TEMPERATURE: 97.9 F | DIASTOLIC BLOOD PRESSURE: 82 MMHG | BODY MASS INDEX: 46.44 KG/M2 | HEIGHT: 64 IN

## 2020-12-03 DIAGNOSIS — E03.8 OTHER SPECIFIED HYPOTHYROIDISM: ICD-10-CM

## 2020-12-03 DIAGNOSIS — I10 ESSENTIAL HYPERTENSION: Primary | ICD-10-CM

## 2020-12-03 DIAGNOSIS — Z11.59 NEED FOR HEPATITIS C SCREENING TEST: ICD-10-CM

## 2020-12-03 DIAGNOSIS — M10.9 ACUTE GOUT OF RIGHT FOOT, UNSPECIFIED CAUSE: ICD-10-CM

## 2020-12-03 DIAGNOSIS — D64.9 ANEMIA, UNSPECIFIED TYPE: ICD-10-CM

## 2020-12-03 PROBLEM — E78.49 OTHER HYPERLIPIDEMIA: Status: ACTIVE | Noted: 2019-06-04

## 2020-12-03 PROCEDURE — G0009 ADMIN PNEUMOCOCCAL VACCINE: HCPCS | Performed by: INTERNAL MEDICINE

## 2020-12-03 PROCEDURE — 90732 PPSV23 VACC 2 YRS+ SUBQ/IM: CPT | Performed by: INTERNAL MEDICINE

## 2020-12-03 PROCEDURE — 99213 OFFICE O/P EST LOW 20 MIN: CPT | Performed by: INTERNAL MEDICINE

## 2020-12-03 RX ORDER — OLMESARTAN MEDOXOMIL 40 MG/1
40 TABLET ORAL DAILY
Qty: 90 TABLET | Refills: 1 | Status: SHIPPED | OUTPATIENT
Start: 2020-12-03 | End: 2021-06-22

## 2020-12-03 RX ORDER — AMLODIPINE BESYLATE 10 MG/1
10 TABLET ORAL DAILY
Qty: 90 TABLET | Refills: 1 | Status: SHIPPED | OUTPATIENT
Start: 2020-12-03 | End: 2021-06-22

## 2020-12-03 RX ORDER — COLCHICINE 0.6 MG/1
TABLET ORAL
Qty: 20 TABLET | Refills: 1 | Status: SHIPPED | OUTPATIENT
Start: 2020-12-03 | End: 2021-12-07

## 2020-12-03 NOTE — PROGRESS NOTES
Subjective        Chief Complaint   Patient presents with   • Hypertension           Lynnette Wright is a 69 y.o. female who presents for    Patient Active Problem List   Diagnosis   • Other specified hypothyroidism   • Anemia   • B12 deficiency   • Essential hypertension   • Other hyperlipidemia   • Tubular adenoma   • Morbidly obese (CMS/HCC)       History of Present Illness     She has not checked her BP. She denies chest pain or dyspnea. She is not exercising much.   Allergies   Allergen Reactions   • Contrast Dye Hives       Current Outpatient Medications on File Prior to Visit   Medication Sig Dispense Refill   • allopurinol (ZYLOPRIM) 300 MG tablet Take 1 tablet by mouth Daily. 90 tablet 1   • aspirin 81 MG chewable tablet Chew 81 mg Daily.     • atorvastatin (LIPITOR) 40 MG tablet Take 1 tablet by mouth Daily. 90 tablet 1   • benazepril (LOTENSIN) 40 MG tablet Take 1 tablet by mouth Daily. 90 tablet 1   • folic acid (FOLVITE) 1 MG tablet Take 1 tablet by mouth Daily. 90 tablet 1   • glipiZIDE (GLUCOTROL XL) 10 MG 24 hr tablet Take 10 mg by mouth 2 (Two) Times a Day.     • glucose blood test strip 1 each by Other route As Needed. Use as instructed     • hydroCHLOROthiazide (HYDRODIURIL) 25 MG tablet Take 1 tablet by mouth Daily. 90 tablet 1   • Insulin Aspart (NOVOLOG FLEXPEN SC) Inject  under the skin into the appropriate area as directed. 13 units in the am and 16u in the PM     • Insulin Glargine (LANTUS SOLOSTAR) 100 UNIT/ML injection pen Inject 54 Units under the skin into the appropriate area as directed Daily.     • Insulin Pen Needle 31G X 5 MM misc      • Lancets 30G misc      • levothyroxine (SYNTHROID, LEVOTHROID) 112 MCG tablet Take 1 tablet by mouth Daily. 90 tablet 1   • levothyroxine (SYNTHROID, LEVOTHROID) 200 MCG tablet Take 1 tablet by mouth Daily. 90 tablet 1   • Liraglutide (VICTOZA) 18 MG/3ML solution pen-injector injection Inject 1.2 mg under the skin into the appropriate area as directed  Daily.     • metFORMIN (GLUCOPHAGE) 500 MG tablet Take 500 mg by mouth 2 (Two) Times a Day With Meals.     • metoprolol succinate XL (TOPROL-XL) 50 MG 24 hr tablet Take 1 tablet by mouth Daily. 90 tablet 1   • TURMERIC PO Take  by mouth.     • vitamin B-12 (CYANOCOBALAMIN) 1000 MCG tablet Take 1,000 mcg by mouth Daily.     • [DISCONTINUED] amLODIPine (NORVASC) 10 MG tablet Take 1 tablet by mouth Daily. 90 tablet 1   • [DISCONTINUED] Insulin Glargine (LANTUS SOLOSTAR) 100 UNIT/ML injection pen Inject 52 Units under the skin into the appropriate area as directed Daily.     • [DISCONTINUED] metFORMIN (GLUCOPHAGE) 1000 MG tablet Take 1,000 mg by mouth 2 (Two) Times a Day With Meals.     • [DISCONTINUED] olmesartan (BENICAR) 40 MG tablet Take 1 tablet by mouth Daily. 90 tablet 1   • [DISCONTINUED] colchicine 0.6 MG tablet Take 2 tablets once and 1 tablet 1 hr later. Then 1 po until sx resolve. 90 tablet 1     No current facility-administered medications on file prior to visit.        Past Medical History:   Diagnosis Date   • B12 deficiency    • Cataract    • Diverticulosis    • Fatty liver    • Gout    • Heart murmur    • Hemangioma    • Lipoma    • LVH (left ventricular hypertrophy)    • Obesity    • ELLA (obstructive sleep apnea)        Past Surgical History:   Procedure Laterality Date   • BREAST BIOPSY     • BREAST LUMPECTOMY     • BREAST SURGERY     • CATARACT EXTRACTION     • CHOLECYSTECTOMY     • COLONOSCOPY  2013   • COLONOSCOPY N/A 7/3/2019    Procedure: COLONOSCOPY to cecum with biopsy / polypectomy;  Surgeon: Royal Zuleta MD;  Location[facility];  Service: General   • GALLBLADDER SURGERY     • HYSTERECTOMY  1998    FOR BLEEDING   • THYROID SURGERY      nodule removed left thyroid   • THYROIDECTOMY  1998    total thyroidectomy       Family History   Problem Relation Age of Onset   • Hypertension Mother    • Other Mother         fall   • Hypertension Father    • Bone cancer Father    • Lung cancer  "Father    • Diabetes Brother    • Cancer Brother         sinus   • Hypertension Maternal Grandmother    • Stroke Maternal Grandmother    • Dementia Paternal Grandmother    • Alzheimer's disease Paternal Grandmother    • Stroke Paternal Grandfather        Social History     Socioeconomic History   • Marital status:      Spouse name: Not on file   • Number of children: Not on file   • Years of education: Not on file   • Highest education level: Not on file   Tobacco Use   • Smoking status: Never Smoker   • Smokeless tobacco: Never Used   Substance and Sexual Activity   • Alcohol use: Yes     Frequency: 2-4 times a month     Drinks per session: 1 or 2     Comment: twice a month   • Drug use: No   • Sexual activity: Defer           The following portions of the patient's history were reviewed and updated as appropriate: problem list, allergies, current medications, past medical history, past family history, past social history and past surgical history.    Review of Systems   Respiratory: Negative for shortness of breath.    Cardiovascular: Negative for chest pain.       Immunization History   Administered Date(s) Administered   • Fluzone High Dose =>65 Years ([employer] ONLY) 10/25/2018, 10/03/2019, 10/12/2020   • Hepatitis A 12/12/2017, 06/11/2018   • Hepatitis B 12/12/2017, 06/11/2018   • Hepatitis B Vaccine Adult IM 10/07/2019   • Pneumococcal Conjugate 13-Valent (PCV13) 05/18/2015   • Pneumococcal Polysaccharide (PPSV23) 01/01/2011   • Tdap 01/01/2013   • Zostavax 01/01/2012       Objective   Vitals:    12/03/20 1313   BP: 114/82   Temp: 97.9 °F (36.6 °C)   Weight: 123 kg (272 lb)   Height: 162.6 cm (64\")     Body mass index is 46.69 kg/m².  Physical Exam  Vitals signs reviewed.   Constitutional:       Appearance: She is well-developed.   HENT:      Head: Normocephalic and atraumatic.   Cardiovascular:      Rate and Rhythm: Normal rate and regular rhythm.      Heart sounds: Normal heart sounds, S1 normal and S2 " normal.   Pulmonary:      Effort: Pulmonary effort is normal.      Breath sounds: Normal breath sounds.   Feet:      Right foot:      Protective Sensation: 5 sites tested. 5 sites sensed.      Skin integrity: Skin integrity normal.      Left foot:      Protective Sensation: 5 sites tested. 5 sites sensed.      Skin integrity: Skin integrity normal.   Skin:     General: Skin is warm.   Neurological:      Mental Status: She is alert.   Psychiatric:         Behavior: Behavior normal.         Procedures    Assessment/Plan   Diagnoses and all orders for this visit:    1. Essential hypertension (Primary)  -     Comprehensive Metabolic Panel; Future  -     Lipid Panel With / Chol / HDL Ratio; Future    2. Other specified hypothyroidism  -     TSH; Future    3. Anemia, unspecified type  -     CBC & Differential; Future    4. Acute gout of right foot, unspecified cause  -     colchicine 0.6 MG tablet; Take 2 tablets once and 1 tablet 1 hr later. Then 1 po until sx resolve.  Dispense: 20 tablet; Refill: 1    5. Need for hepatitis C screening test  -     Hepatitis C Antibody; Future    Other orders  -     Pneumococcal Polysaccharide Vaccine 23-Valent Greater Than or Equal To 3yo Subcutaneous / IM  -     amLODIPine (NORVASC) 10 MG tablet; Take 1 tablet by mouth Daily.  Dispense: 90 tablet; Refill: 1  -     olmesartan (BENICAR) 40 MG tablet; Take 1 tablet by mouth Daily.  Dispense: 90 tablet; Refill: 1               BP is good. TSH is at goal. Blood ct stable. Pneumovax today.  Return in about 6 months (around 6/3/2021) for Medicare Wellness.

## 2020-12-25 DIAGNOSIS — E78.5 HYPERLIPIDEMIA, UNSPECIFIED HYPERLIPIDEMIA TYPE: ICD-10-CM

## 2020-12-25 DIAGNOSIS — E03.8 OTHER SPECIFIED HYPOTHYROIDISM: ICD-10-CM

## 2020-12-26 DIAGNOSIS — D64.9 ANEMIA, UNSPECIFIED TYPE: ICD-10-CM

## 2020-12-28 RX ORDER — LEVOTHYROXINE SODIUM 112 UG/1
112 TABLET ORAL DAILY
Qty: 90 TABLET | Refills: 1 | Status: SHIPPED | OUTPATIENT
Start: 2020-12-28 | End: 2021-12-02

## 2020-12-28 RX ORDER — LEVOTHYROXINE SODIUM 0.2 MG/1
200 TABLET ORAL DAILY
Qty: 90 TABLET | Refills: 1 | Status: SHIPPED | OUTPATIENT
Start: 2020-12-28 | End: 2021-06-01

## 2020-12-28 RX ORDER — FOLIC ACID 1 MG/1
1 TABLET ORAL DAILY
Qty: 90 TABLET | Refills: 1 | Status: SHIPPED | OUTPATIENT
Start: 2020-12-28 | End: 2021-12-07 | Stop reason: SDUPTHER

## 2020-12-28 RX ORDER — ATORVASTATIN CALCIUM 40 MG/1
40 TABLET, FILM COATED ORAL DAILY
Qty: 90 TABLET | Refills: 1 | Status: SHIPPED | OUTPATIENT
Start: 2020-12-28 | End: 2022-01-31

## 2021-03-09 DIAGNOSIS — Z23 IMMUNIZATION DUE: ICD-10-CM

## 2021-03-10 DIAGNOSIS — I10 ESSENTIAL HYPERTENSION: ICD-10-CM

## 2021-03-10 RX ORDER — METOPROLOL SUCCINATE 50 MG/1
50 TABLET, EXTENDED RELEASE ORAL DAILY
Qty: 90 TABLET | Refills: 1 | Status: CANCELLED | OUTPATIENT
Start: 2021-03-10

## 2021-03-10 RX ORDER — METOPROLOL SUCCINATE 50 MG/1
50 TABLET, EXTENDED RELEASE ORAL DAILY
Qty: 90 TABLET | Refills: 1 | Status: SHIPPED | OUTPATIENT
Start: 2021-03-10 | End: 2022-05-23 | Stop reason: SDUPTHER

## 2021-06-01 ENCOUNTER — OFFICE VISIT (OUTPATIENT)
Dept: INTERNAL MEDICINE | Facility: CLINIC | Age: 71
End: 2021-06-01

## 2021-06-01 VITALS
WEIGHT: 267 LBS | SYSTOLIC BLOOD PRESSURE: 110 MMHG | DIASTOLIC BLOOD PRESSURE: 72 MMHG | HEIGHT: 64 IN | BODY MASS INDEX: 45.58 KG/M2 | TEMPERATURE: 97.5 F

## 2021-06-01 DIAGNOSIS — E03.8 OTHER SPECIFIED HYPOTHYROIDISM: ICD-10-CM

## 2021-06-01 DIAGNOSIS — D64.9 ANEMIA, UNSPECIFIED TYPE: Primary | ICD-10-CM

## 2021-06-01 DIAGNOSIS — I10 ESSENTIAL HYPERTENSION: Chronic | ICD-10-CM

## 2021-06-01 DIAGNOSIS — Z78.0 POST-MENOPAUSAL: ICD-10-CM

## 2021-06-01 DIAGNOSIS — Z11.59 NEED FOR HEPATITIS C SCREENING TEST: ICD-10-CM

## 2021-06-01 DIAGNOSIS — Z12.31 ENCOUNTER FOR SCREENING MAMMOGRAM FOR MALIGNANT NEOPLASM OF BREAST: ICD-10-CM

## 2021-06-01 PROBLEM — E78.49 OTHER HYPERLIPIDEMIA: Chronic | Status: ACTIVE | Noted: 2019-06-04

## 2021-06-01 PROCEDURE — 99214 OFFICE O/P EST MOD 30 MIN: CPT | Performed by: INTERNAL MEDICINE

## 2021-06-01 RX ORDER — ASCORBIC ACID 1000 MG
TABLET, EXTENDED RELEASE ORAL
COMMUNITY

## 2021-06-01 NOTE — PROGRESS NOTES
Subjective        Chief Complaint   Patient presents with   • Hypertension           Lynnette Wright is a 70 y.o. female who presents for    Patient Active Problem List   Diagnosis   • Other specified hypothyroidism   • Anemia   • B12 deficiency   • Essential hypertension   • Other hyperlipidemia   • Tubular adenoma   • Morbidly obese (CMS/HCC)       History of Present Illness     She says her last a1c was 10.7 with microalbuminuria. Her levothyroxine was decreased since her TSH was suppressed. She has not been checking her BP. She denies chest pain, dyspnea, nausea or abdominal pain.  Allergies   Allergen Reactions   • Contrast Dye Hives       Current Outpatient Medications on File Prior to Visit   Medication Sig Dispense Refill   • allopurinol (ZYLOPRIM) 300 MG tablet Take 1 tablet by mouth Daily. 90 tablet 1   • amLODIPine (NORVASC) 10 MG tablet Take 1 tablet by mouth Daily. 90 tablet 1   • Ascorbic Acid (Vitamin C ER) 1000 MG tablet controlled-release Take  by mouth.     • aspirin 81 MG chewable tablet Chew 81 mg Daily.     • atorvastatin (LIPITOR) 40 MG tablet TAKE 1 TABLET BY MOUTH DAILY 90 tablet 1   • benazepril (LOTENSIN) 40 MG tablet Take 1 tablet by mouth Daily. 90 tablet 1   • colchicine 0.6 MG tablet Take 2 tablets once and 1 tablet 1 hr later. Then 1 po until sx resolve. 20 tablet 1   • folic acid (FOLVITE) 1 MG tablet TAKE 1 TABLET BY MOUTH DAILY 90 tablet 1   • glucose blood test strip 1 each by Other route As Needed. Use as instructed     • hydroCHLOROthiazide (HYDRODIURIL) 25 MG tablet Take 1 tablet by mouth Daily. 90 tablet 1   • Insulin Aspart (NOVOLOG FLEXPEN SC) Inject  under the skin into the appropriate area as directed. 15 units in the am and 16u in the PM     • Insulin Glargine (LANTUS SOLOSTAR) 100 UNIT/ML injection pen Inject 54 Units under the skin into the appropriate area as directed Daily.     • Insulin Pen Needle 31G X 5 MM misc To use with Lantus Solorstar once daily 100 each 3   •  Lancets 30G misc      • levothyroxine (SYNTHROID, LEVOTHROID) 112 MCG tablet TAKE 1 TABLET BY MOUTH DAILY (Patient taking differently: Take 112 mcg by mouth 2 (two) times a day.) 90 tablet 1   • Liraglutide (VICTOZA) 18 MG/3ML solution pen-injector injection Inject 1.2 mg under the skin into the appropriate area as directed Daily.     • metFORMIN (GLUCOPHAGE) 500 MG tablet Take 500 mg by mouth 2 (Two) Times a Day With Meals.     • metoprolol succinate XL (TOPROL-XL) 50 MG 24 hr tablet TAKE 1 TABLET BY MOUTH DAILY 90 tablet 1   • olmesartan (BENICAR) 40 MG tablet Take 1 tablet by mouth Daily. 90 tablet 1   • TURMERIC PO Take  by mouth.     • vitamin B-12 (CYANOCOBALAMIN) 1000 MCG tablet Take 1,000 mcg by mouth Daily.     • [DISCONTINUED] glipiZIDE (GLUCOTROL XL) 10 MG 24 hr tablet Take 10 mg by mouth 2 (Two) Times a Day.     • [DISCONTINUED] levothyroxine (SYNTHROID, LEVOTHROID) 200 MCG tablet TAKE 1 TABLET BY MOUTH DAILY 90 tablet 1     No current facility-administered medications on file prior to visit.       Past Medical History:   Diagnosis Date   • Cataract    • Diverticulosis    • Fatty liver    • Gout    • Heart murmur    • Hemangioma    • Lipoma    • LVH (left ventricular hypertrophy)    • Obesity    • ELLA (obstructive sleep apnea)        Past Surgical History:   Procedure Laterality Date   • BREAST BIOPSY     • BREAST LUMPECTOMY     • BREAST SURGERY     • CATARACT EXTRACTION     • CHOLECYSTECTOMY     • COLONOSCOPY  2013   • COLONOSCOPY N/A 7/3/2019    Procedure: COLONOSCOPY to cecum with biopsy / polypectomy;  Surgeon: Royal Zuleta MD;  Location: [facility] ENDOSCOPY;  Service: General   • GALLBLADDER SURGERY     • HYSTERECTOMY  1998    FOR BLEEDING   • THYROID SURGERY      nodule removed left thyroid   • THYROIDECTOMY  1998    total thyroidectomy       Family History   Problem Relation Age of Onset   • Hypertension Mother    • Other Mother         fall   • Hypertension Father    • Bone cancer Father    •  "Lung cancer Father    • Diabetes Brother    • Cancer Brother         sinus   • Hypertension Maternal Grandmother    • Stroke Maternal Grandmother    • Dementia Paternal Grandmother    • Alzheimer's disease Paternal Grandmother    • Stroke Paternal Grandfather        Social History     Socioeconomic History   • Marital status:      Spouse name: Not on file   • Number of children: Not on file   • Years of education: Not on file   • Highest education level: Not on file   Tobacco Use   • Smoking status: Never Smoker   • Smokeless tobacco: Never Used   Substance and Sexual Activity   • Alcohol use: Yes     Comment: twice a month   • Drug use: No   • Sexual activity: Defer           The following portions of the patient's history were reviewed and updated as appropriate: problem list, allergies, current medications, past medical history, past family history, past social history and past surgical history.    Review of Systems    Immunization History   Administered Date(s) Administered   • COVID-19 (MODERNA) 01/22/2021, 02/19/2021   • Fluzone High Dose =>65 Years ([employer] ONLY) 10/25/2018, 10/03/2019, 10/12/2020   • Hepatitis A 12/12/2017, 06/11/2018   • Hepatitis B 12/12/2017, 06/11/2018   • Hepatitis B Vaccine Adult IM 10/07/2019   • Pneumococcal Conjugate 13-Valent (PCV13) 05/18/2015   • Pneumococcal Polysaccharide (PPSV23) 01/01/2011, 12/03/2020   • Tdap 01/01/2013   • Zostavax 01/01/2012       Objective   Vitals:    06/01/21 1117   BP: 110/72   Temp: 97.5 °F (36.4 °C)   Weight: 121 kg (267 lb)   Height: 162.6 cm (64\")     Body mass index is 45.83 kg/m².  Physical Exam  Vitals reviewed.   Constitutional:       Appearance: She is well-developed.   HENT:      Head: Normocephalic and atraumatic.   Cardiovascular:      Rate and Rhythm: Normal rate and regular rhythm.      Heart sounds: Normal heart sounds, S1 normal and S2 normal.   Pulmonary:      Effort: Pulmonary effort is normal.      Breath sounds: Normal breath " sounds.   Skin:     General: Skin is warm.   Neurological:      Mental Status: She is alert.   Psychiatric:         Behavior: Behavior normal.         Procedures    Assessment/Plan   Diagnoses and all orders for this visit:    1. Anemia, unspecified type (Primary)  -     CBC & Differential    2. Need for hepatitis C screening test  -     Hepatitis C Antibody    3. Post-menopausal  -     DEXA Bone Density Axial; Future    4. Essential hypertension    5. Other specified hypothyroidism    6. Encounter for screening mammogram for malignant neoplasm of breast  -     Mammo Screening Bilateral With CAD             BP is good. Reviewed TSH, CMP and FLP from [facility]. Dr. Hernandez has been adjusting her thyroid replacement and insulin. She will get the dates of her Shingrix for me.    Return in about 6 months (around 12/1/2021) for Medicare Wellness.

## 2021-06-02 LAB
BASOPHILS # BLD AUTO: 0.09 10*3/MM3 (ref 0–0.2)
BASOPHILS NFR BLD AUTO: 1 % (ref 0–1.5)
EOSINOPHIL # BLD AUTO: 0.17 10*3/MM3 (ref 0–0.4)
EOSINOPHIL NFR BLD AUTO: 1.9 % (ref 0.3–6.2)
ERYTHROCYTE [DISTWIDTH] IN BLOOD BY AUTOMATED COUNT: 15.5 % (ref 12.3–15.4)
HCT VFR BLD AUTO: 38.4 % (ref 34–46.6)
HCV AB S/CO SERPL IA: <0.1 S/CO RATIO (ref 0–0.9)
HGB BLD-MCNC: 12.1 G/DL (ref 12–15.9)
IMM GRANULOCYTES # BLD AUTO: 0.04 10*3/MM3 (ref 0–0.05)
IMM GRANULOCYTES NFR BLD AUTO: 0.4 % (ref 0–0.5)
LYMPHOCYTES # BLD AUTO: 2.39 10*3/MM3 (ref 0.7–3.1)
LYMPHOCYTES NFR BLD AUTO: 26.8 % (ref 19.6–45.3)
MCH RBC QN AUTO: 28.2 PG (ref 26.6–33)
MCHC RBC AUTO-ENTMCNC: 31.5 G/DL (ref 31.5–35.7)
MCV RBC AUTO: 89.5 FL (ref 79–97)
MONOCYTES # BLD AUTO: 0.6 10*3/MM3 (ref 0.1–0.9)
MONOCYTES NFR BLD AUTO: 6.7 % (ref 5–12)
NEUTROPHILS # BLD AUTO: 5.62 10*3/MM3 (ref 1.7–7)
NEUTROPHILS NFR BLD AUTO: 63.2 % (ref 42.7–76)
NRBC BLD AUTO-RTO: 0 /100 WBC (ref 0–0.2)
PLATELET # BLD AUTO: 357 10*3/MM3 (ref 140–450)
RBC # BLD AUTO: 4.29 10*6/MM3 (ref 3.77–5.28)
WBC # BLD AUTO: 8.91 10*3/MM3 (ref 3.4–10.8)

## 2021-06-21 DIAGNOSIS — M10.9 ACUTE GOUT OF RIGHT FOOT, UNSPECIFIED CAUSE: ICD-10-CM

## 2021-06-21 DIAGNOSIS — I10 ESSENTIAL HYPERTENSION: ICD-10-CM

## 2021-06-22 RX ORDER — AMLODIPINE BESYLATE 10 MG/1
TABLET ORAL
Qty: 90 TABLET | Refills: 1 | Status: SHIPPED | OUTPATIENT
Start: 2021-06-22

## 2021-06-22 RX ORDER — OLMESARTAN MEDOXOMIL 40 MG/1
TABLET ORAL
Qty: 90 TABLET | Refills: 1 | Status: SHIPPED | OUTPATIENT
Start: 2021-06-22

## 2021-06-22 RX ORDER — HYDROCHLOROTHIAZIDE 25 MG/1
TABLET ORAL
Qty: 90 TABLET | Refills: 1 | Status: SHIPPED | OUTPATIENT
Start: 2021-06-22 | End: 2021-12-07

## 2021-06-22 RX ORDER — ALLOPURINOL 300 MG/1
TABLET ORAL
Qty: 90 TABLET | Refills: 1 | Status: SHIPPED | OUTPATIENT
Start: 2021-06-22 | End: 2022-03-17 | Stop reason: SDUPTHER

## 2021-08-02 ENCOUNTER — APPOINTMENT (OUTPATIENT)
Dept: WOMENS IMAGING | Facility: HOSPITAL | Age: 71
End: 2021-08-02

## 2021-08-02 PROCEDURE — 77063 BREAST TOMOSYNTHESIS BI: CPT | Performed by: RADIOLOGY

## 2021-08-02 PROCEDURE — 77067 SCR MAMMO BI INCL CAD: CPT | Performed by: RADIOLOGY

## 2021-11-12 DIAGNOSIS — I10 ESSENTIAL HYPERTENSION: ICD-10-CM

## 2021-11-12 RX ORDER — BENAZEPRIL HYDROCHLORIDE 40 MG/1
40 TABLET, FILM COATED ORAL DAILY
Qty: 90 TABLET | Refills: 3 | Status: SHIPPED | OUTPATIENT
Start: 2021-11-12 | End: 2022-03-17 | Stop reason: SDUPTHER

## 2021-11-30 ENCOUNTER — HOSPITAL ENCOUNTER (OUTPATIENT)
Dept: BONE DENSITY | Facility: HOSPITAL | Age: 71
Discharge: HOME | End: 2021-11-30
Admitting: INTERNAL MEDICINE

## 2021-11-30 DIAGNOSIS — Z78.0 POST-MENOPAUSAL: ICD-10-CM

## 2021-11-30 PROCEDURE — 77080 DXA BONE DENSITY AXIAL: CPT

## 2021-12-02 DIAGNOSIS — E03.8 OTHER SPECIFIED HYPOTHYROIDISM: ICD-10-CM

## 2021-12-02 RX ORDER — LEVOTHYROXINE SODIUM 112 UG/1
112 TABLET ORAL DAILY
Qty: 90 TABLET | Refills: 3 | Status: SHIPPED | OUTPATIENT
Start: 2021-12-02 | End: 2022-06-13

## 2021-12-06 ENCOUNTER — OFFICE VISIT (OUTPATIENT)
Dept: INTERNAL MEDICINE | Facility: CLINIC | Age: 71
End: 2021-12-06

## 2021-12-06 VITALS
SYSTOLIC BLOOD PRESSURE: 108 MMHG | BODY MASS INDEX: 46.61 KG/M2 | TEMPERATURE: 96.8 F | HEIGHT: 64 IN | WEIGHT: 273 LBS | DIASTOLIC BLOOD PRESSURE: 78 MMHG

## 2021-12-06 DIAGNOSIS — Z00.00 MEDICARE ANNUAL WELLNESS VISIT, SUBSEQUENT: Primary | ICD-10-CM

## 2021-12-06 DIAGNOSIS — I10 ESSENTIAL HYPERTENSION: Chronic | ICD-10-CM

## 2021-12-06 PROCEDURE — 1170F FXNL STATUS ASSESSED: CPT | Performed by: INTERNAL MEDICINE

## 2021-12-06 PROCEDURE — 1159F MED LIST DOCD IN RCRD: CPT | Performed by: INTERNAL MEDICINE

## 2021-12-06 PROCEDURE — G0439 PPPS, SUBSEQ VISIT: HCPCS | Performed by: INTERNAL MEDICINE

## 2021-12-06 RX ORDER — INSULIN GLARGINE 100 [IU]/ML
INJECTION, SOLUTION SUBCUTANEOUS DAILY
COMMUNITY

## 2021-12-06 NOTE — PROGRESS NOTES
The ABCs of the Annual Wellness Visit  Subsequent Medicare Wellness Visit    Chief Complaint   Patient presents with   • Medicare Wellness-subsequent      Subjective    History of Present Illness:  Lynnette Wright is a 71 y.o. female who presents for a Subsequent Medicare Wellness Visit.  She denies chest pain or dyspnea. She has not been checking her BP. She sees endo next Monday.  The following portions of the patient's history were reviewed and   updated as appropriate: allergies, current medications, past family history, past medical history, past social history, past surgical history and problem list.    Compared to one year ago, the patient feels her physical   health is better.    Compared to one year ago, the patient feels her mental   health is the same.    Recent Hospitalizations:  She was not admitted to the hospital during the last year.       Current Medical Providers:  Patient Care Team:  Jacob Marroquin MD as PCP - General (Internal Medicine)  Milly Morales MD as Consulting Physician (Obstetrics and Gynecology)    Outpatient Medications Prior to Visit   Medication Sig Dispense Refill   • allopurinol (ZYLOPRIM) 300 MG tablet TAKE 1 TABLET DAILY 90 tablet 1   • amLODIPine (NORVASC) 10 MG tablet TAKE 1 TABLET DAILY 90 tablet 1   • Ascorbic Acid (Vitamin C ER) 1000 MG tablet controlled-release Take  by mouth.     • atorvastatin (LIPITOR) 40 MG tablet TAKE 1 TABLET BY MOUTH DAILY 90 tablet 1   • benazepril (LOTENSIN) 40 MG tablet TAKE 1 TABLET BY MOUTH DAILY 90 tablet 3   • colchicine 0.6 MG tablet Take 2 tablets once and 1 tablet 1 hr later. Then 1 po until sx resolve. 20 tablet 1   • folic acid (FOLVITE) 1 MG tablet TAKE 1 TABLET BY MOUTH DAILY 90 tablet 1   • glucose blood test strip 1 each by Other route As Needed. Use as instructed     • hydroCHLOROthiazide (HYDRODIURIL) 25 MG tablet TAKE 1 TABLET DAILY 90 tablet 1   • Insulin Aspart (NOVOLOG FLEXPEN SC) Inject  under the skin into the  appropriate area as directed. 15 units in the am and 16u in the PM     • insulin glargine (LANTUS, SEMGLEE) 100 UNIT/ML injection Inject  under the skin into the appropriate area as directed Daily. 20 units in the am and 56 units in the pm     • Insulin Pen Needle 31G X 5 MM misc To use with Lantus Solorstar once daily 100 each 3   • Lancets 30G misc      • levothyroxine (SYNTHROID, LEVOTHROID) 112 MCG tablet TAKE 1 TABLET BY MOUTH DAILY 90 tablet 3   • Liraglutide (VICTOZA) 18 MG/3ML solution pen-injector injection Inject 1.2 mg under the skin into the appropriate area as directed Daily.     • metFORMIN (GLUCOPHAGE) 500 MG tablet Take 500 mg by mouth 2 (Two) Times a Day With Meals.     • metoprolol succinate XL (TOPROL-XL) 50 MG 24 hr tablet TAKE 1 TABLET BY MOUTH DAILY 90 tablet 1   • olmesartan (BENICAR) 40 MG tablet TAKE 1 TABLET DAILY 90 tablet 1   • TURMERIC PO Take  by mouth.     • vitamin B-12 (CYANOCOBALAMIN) 1000 MCG tablet Take 1,000 mcg by mouth Daily.     • aspirin 81 MG chewable tablet Chew 81 mg Daily.     • Insulin Glargine (LANTUS SOLOSTAR) 100 UNIT/ML injection pen Inject 54 Units under the skin into the appropriate area as directed Daily.       No facility-administered medications prior to visit.       No opioid medication identified on active medication list. I have reviewed chart for other potential  high risk medication/s and harmful drug interactions in the elderly.          Aspirin is on active medication list. Aspirin use is not indicated based on review of current medical condition/s. Risk of harm outweighs potential benefits. Patient instructed to discontinue this medication.  .      Patient Active Problem List   Diagnosis   • Other specified hypothyroidism   • Anemia   • B12 deficiency   • Essential hypertension   • Other hyperlipidemia   • Tubular adenoma   • Morbidly obese (HCC)     Advance Care Planning  Advance Directive is not on file.  ACP discussion was held with the patient during  "this visit. Patient has an advance directive (not in EMR), copy requested.          Objective    Vitals:    12/06/21 1048   BP: 108/78   Temp: 96.8 °F (36 °C)   Weight: 124 kg (273 lb)   Height: 162 cm (63.78\")     Body mass index is 47.18 kg/m².  BMI has not been calculated during today's encounter.     Does the patient have evidence of cognitive impairment? No    Physical Exam  Vitals reviewed.   Constitutional:       Appearance: She is well-developed.   HENT:      Head: Normocephalic and atraumatic.   Neck:      Vascular: No carotid bruit.   Cardiovascular:      Rate and Rhythm: Normal rate and regular rhythm.      Heart sounds: Normal heart sounds, S1 normal and S2 normal.   Pulmonary:      Effort: Pulmonary effort is normal.      Breath sounds: Normal breath sounds.   Lymphadenopathy:      Cervical: No cervical adenopathy.   Skin:     General: Skin is warm.   Neurological:      Mental Status: She is alert.   Psychiatric:         Behavior: Behavior normal.                 HEALTH RISK ASSESSMENT    Smoking Status:  Social History     Tobacco Use   Smoking Status Never Smoker   Smokeless Tobacco Never Used     Alcohol Consumption:  Social History     Substance and Sexual Activity   Alcohol Use Yes    Comment: twice a month     Fall Risk Screen:    STEADI Fall Risk Assessment was completed, and patient is at LOW risk for falls.Assessment completed on:12/6/2021    Depression Screening:  PHQ-2/PHQ-9 Depression Screening 12/6/2021   Little interest or pleasure in doing things 0   Feeling down, depressed, or hopeless 0   Total Score 0       Health Habits and Functional and Cognitive Screening:  Functional & Cognitive Status 12/6/2021   Do you have difficulty preparing food and eating? No   Do you have difficulty bathing yourself, getting dressed or grooming yourself? No   Do you have difficulty using the toilet? No   Do you have difficulty moving around from place to place? No   Do you have trouble with steps or " getting out of a bed or a chair? No   Current Diet Unhealthy Diet   Dental Exam Up to date   Eye Exam Up to date   Exercise (times per week) 0 times per week   Do you need help using the phone?  No   Are you deaf or do you have serious difficulty hearing?  No   Do you need help with transportation? No   Do you need help shopping? No   Do you need help preparing meals?  No   Do you need help with housework?  No   Do you need help with laundry? No   Do you need help taking your medications? No   Do you need help managing money? No   Do you ever drive or ride in a car without wearing a seat belt? No   Have you felt unusual stress, anger or loneliness in the last month? No   Who do you live with? Spouse   If you need help, do you have trouble finding someone available to you? No   Have you been bothered in the last four weeks by sexual problems? No   Do you have difficulty concentrating, remembering or making decisions? No       Age-appropriate Screening Schedule:  Refer to the list below for future screening recommendations based on patient's age, sex and/or medical conditions. Orders for these recommended tests are listed in the plan section. The patient has been provided with a written plan.    Health Maintenance   Topic Date Due   • ZOSTER VACCINE (2 of 3) 02/26/2012   • DIABETIC FOOT EXAM  12/03/2021   • LIPID PANEL  04/12/2022   • HEMOGLOBIN A1C  06/06/2022   • DIABETIC EYE EXAM  06/30/2022   • URINE MICROALBUMIN  12/06/2022   • TDAP/TD VACCINES (2 - Td or Tdap) 01/01/2023   • MAMMOGRAM  08/02/2023   • DXA SCAN  11/30/2023   • INFLUENZA VACCINE  Completed              Assessment/Plan   CMS Preventative Services Quick Reference  Risk Factors Identified During Encounter  Immunizations Discussed/Encouraged (specific Immunizations; Shingrix  The above risks/problems have been discussed with the patient.  Follow up actions/plans if indicated are seen below in the Assessment/Plan Section.  Pertinent information has been  shared with the patient in the After Visit Summary.    Diagnoses and all orders for this visit:    1. Medicare annual wellness visit, subsequent (Primary)    2. Essential hypertension  -     Comprehensive Metabolic Panel; Future  -     Lipid Panel With / Chol / HDL Ratio; Future  -     CBC & Differential; Future        Follow Up:   Return in about 6 months (around 6/6/2022), or 30 minutes, for Lab Before FUP.     An After Visit Summary and PPPS were made available to the patient.               Stop aspirin based on new guidelines. Discussed exercising. Reviewed tsh and a1c from . Nenita is managing her TSH and a1c.  Discussed gastric sleeve; she is not interested. Reviewed DEXA.

## 2021-12-07 DIAGNOSIS — M10.9 ACUTE GOUT OF RIGHT FOOT, UNSPECIFIED CAUSE: ICD-10-CM

## 2021-12-07 DIAGNOSIS — I10 ESSENTIAL HYPERTENSION: ICD-10-CM

## 2021-12-07 DIAGNOSIS — D64.9 ANEMIA, UNSPECIFIED TYPE: ICD-10-CM

## 2021-12-07 RX ORDER — FOLIC ACID 1 MG/1
1 TABLET ORAL DAILY
Qty: 90 TABLET | Refills: 1 | Status: SHIPPED | OUTPATIENT
Start: 2021-12-07 | End: 2022-10-26 | Stop reason: SDUPTHER

## 2021-12-07 RX ORDER — COLCHICINE 0.6 MG/1
TABLET ORAL
Qty: 20 TABLET | Refills: 1 | OUTPATIENT
Start: 2021-12-07

## 2021-12-07 RX ORDER — COLCHICINE 0.6 MG/1
TABLET ORAL
Qty: 20 TABLET | Refills: 1 | Status: SHIPPED | OUTPATIENT
Start: 2021-12-07 | End: 2022-12-30

## 2021-12-07 RX ORDER — HYDROCHLOROTHIAZIDE 25 MG/1
TABLET ORAL
Qty: 90 TABLET | Refills: 1 | Status: SHIPPED | OUTPATIENT
Start: 2021-12-07 | End: 2022-12-08

## 2021-12-07 RX ORDER — COLCHICINE 0.6 MG/1
TABLET ORAL
Qty: 20 TABLET | Refills: 1 | Status: SHIPPED | OUTPATIENT
Start: 2021-12-07 | End: 2021-12-07 | Stop reason: SDUPTHER

## 2021-12-07 NOTE — TELEPHONE ENCOUNTER
Caller: Lynnette Wright    Relationship: Self    Best call back number: [phone]    Requested Prescriptions:   Requested Prescriptions     Pending Prescriptions Disp Refills   • colchicine 0.6 MG tablet 20 tablet 1     Sig: Take 2 tablets once and 1 tablet 1 hr later. Then 1 po until sx resolve.   • folic acid (FOLVITE) 1 MG tablet 90 tablet 1     Sig: Take 1 tablet by mouth Daily.        [id number] [address] AT [place] [id number] [place] [phone] FX     Additional details provided by patient: PATIENT STATES SHE OUT OF FOLIC ACID AND HAS 10 COLCHICINE REMAINING. LEAVING FOR [place] FOR 3 MONTHS ON 12/15/2021    Does the patient have less than a 3 day supply:  [] Yes  [x] No    Gaston Brown Rep   12/07/21 13:27 EST

## 2022-01-31 DIAGNOSIS — E78.5 HYPERLIPIDEMIA, UNSPECIFIED HYPERLIPIDEMIA TYPE: ICD-10-CM

## 2022-01-31 RX ORDER — ATORVASTATIN CALCIUM 40 MG/1
40 TABLET, FILM COATED ORAL DAILY
Qty: 90 TABLET | Refills: 3 | Status: SHIPPED | OUTPATIENT
Start: 2022-01-31 | End: 2022-03-17 | Stop reason: SDUPTHER

## 2022-03-17 DIAGNOSIS — I10 ESSENTIAL HYPERTENSION: ICD-10-CM

## 2022-03-17 DIAGNOSIS — M10.9 ACUTE GOUT OF RIGHT FOOT, UNSPECIFIED CAUSE: ICD-10-CM

## 2022-03-17 DIAGNOSIS — E78.5 HYPERLIPIDEMIA, UNSPECIFIED HYPERLIPIDEMIA TYPE: ICD-10-CM

## 2022-03-17 RX ORDER — ALLOPURINOL 300 MG/1
300 TABLET ORAL DAILY
Qty: 90 TABLET | Refills: 1 | Status: SHIPPED | OUTPATIENT
Start: 2022-03-17 | End: 2022-07-11

## 2022-03-17 RX ORDER — BENAZEPRIL HYDROCHLORIDE 40 MG/1
40 TABLET, FILM COATED ORAL DAILY
Qty: 90 TABLET | Refills: 3 | Status: SHIPPED | OUTPATIENT
Start: 2022-03-17 | End: 2022-12-30

## 2022-03-17 RX ORDER — ATORVASTATIN CALCIUM 40 MG/1
40 TABLET, FILM COATED ORAL DAILY
Qty: 90 TABLET | Refills: 3 | Status: SHIPPED | OUTPATIENT
Start: 2022-03-17 | End: 2022-12-30

## 2022-03-24 ENCOUNTER — TELEPHONE (OUTPATIENT)
Dept: INTERNAL MEDICINE | Facility: CLINIC | Age: 72
End: 2022-03-24

## 2022-03-24 LAB — HBA1C MFR BLD: 10.3 %

## 2022-05-23 DIAGNOSIS — I10 ESSENTIAL HYPERTENSION: ICD-10-CM

## 2022-05-23 RX ORDER — METOPROLOL SUCCINATE 50 MG/1
50 TABLET, EXTENDED RELEASE ORAL DAILY
Qty: 90 TABLET | Refills: 1 | Status: SHIPPED | OUTPATIENT
Start: 2022-05-23 | End: 2022-12-27

## 2022-06-13 ENCOUNTER — OFFICE VISIT (OUTPATIENT)
Dept: INTERNAL MEDICINE | Facility: CLINIC | Age: 72
End: 2022-06-13

## 2022-06-13 VITALS
BODY MASS INDEX: 44.56 KG/M2 | HEIGHT: 64 IN | WEIGHT: 261 LBS | DIASTOLIC BLOOD PRESSURE: 80 MMHG | SYSTOLIC BLOOD PRESSURE: 110 MMHG | TEMPERATURE: 97.5 F

## 2022-06-13 DIAGNOSIS — I10 ESSENTIAL HYPERTENSION: Chronic | ICD-10-CM

## 2022-06-13 DIAGNOSIS — E78.49 OTHER HYPERLIPIDEMIA: Chronic | ICD-10-CM

## 2022-06-13 DIAGNOSIS — E66.01 MORBIDLY OBESE: ICD-10-CM

## 2022-06-13 DIAGNOSIS — Z12.31 ENCOUNTER FOR SCREENING MAMMOGRAM FOR MALIGNANT NEOPLASM OF BREAST: Primary | ICD-10-CM

## 2022-06-13 PROCEDURE — 99214 OFFICE O/P EST MOD 30 MIN: CPT | Performed by: INTERNAL MEDICINE

## 2022-06-13 RX ORDER — LEVOTHYROXINE SODIUM 0.2 MG/1
TABLET ORAL
COMMUNITY
Start: 2022-05-31

## 2022-06-13 NOTE — PROGRESS NOTES
Subjective        Chief Complaint   Patient presents with   • Hypertension           Lynnette Wright is a 71 y.o. female who presents for    Patient Active Problem List   Diagnosis   • Other specified hypothyroidism   • Anemia   • B12 deficiency   • Essential hypertension   • Other hyperlipidemia   • Tubular adenoma   • Morbidly obese (HCC)       History of Present Illness     She has not been checking her BP. Her last a1c was over 11.She was encouraged to get a Free Style Melissa. Her novolog was increased as well. She denies chest pain or dyspnea.  Allergies   Allergen Reactions   • Contrast Dye Hives       Current Outpatient Medications on File Prior to Visit   Medication Sig Dispense Refill   • allopurinol (ZYLOPRIM) 300 MG tablet Take 1 tablet by mouth Daily. 90 tablet 1   • amLODIPine (NORVASC) 10 MG tablet TAKE 1 TABLET DAILY 90 tablet 1   • Ascorbic Acid (Vitamin C ER) 1000 MG tablet controlled-release Take  by mouth.     • atorvastatin (LIPITOR) 40 MG tablet Take 1 tablet by mouth Daily. 90 tablet 3   • benazepril (LOTENSIN) 40 MG tablet Take 1 tablet by mouth Daily. 90 tablet 3   • colchicine 0.6 MG tablet TAKE 2 TABLETS BY MOUTH ONCE THEN 1 TABLET BY MOUTH 1 HOUR LATER THEN 1 TABLET BY MOUTH EVERY DAY UNTIL SYMPTOMS RESOLVE 20 tablet 1   • folic acid (FOLVITE) 1 MG tablet Take 1 tablet by mouth Daily. 90 tablet 1   • glucose blood test strip 1 each by Other route As Needed. Use as instructed     • hydroCHLOROthiazide (HYDRODIURIL) 25 MG tablet TAKE 1 TABLET DAILY 90 tablet 1   • Insulin Aspart (NOVOLOG FLEXPEN SC) Inject  under the skin into the appropriate area as directed. 15 units in the am and 16u in the PM     • insulin glargine (LANTUS, SEMGLEE) 100 UNIT/ML injection Inject  under the skin into the appropriate area as directed Daily. 20 units in the am and 56 units in the pm     • Insulin Pen Needle 31G X 5 MM misc To use with Lantus Solorstar once daily 100 each 3   • Lancets 30G misc      •  levothyroxine (SYNTHROID, LEVOTHROID) 200 MCG tablet      • Liraglutide (VICTOZA) 18 MG/3ML solution pen-injector injection Inject 1.2 mg under the skin into the appropriate area as directed Daily.     • metFORMIN (GLUCOPHAGE) 500 MG tablet Take 500 mg by mouth 2 (Two) Times a Day With Meals.     • metoprolol succinate XL (TOPROL-XL) 50 MG 24 hr tablet Take 1 tablet by mouth Daily. 90 tablet 1   • olmesartan (BENICAR) 40 MG tablet TAKE 1 TABLET DAILY 90 tablet 1   • TURMERIC PO Take  by mouth.     • vitamin B-12 (CYANOCOBALAMIN) 1000 MCG tablet Take 1,000 mcg by mouth Daily.     • [DISCONTINUED] levothyroxine (SYNTHROID, LEVOTHROID) 112 MCG tablet TAKE 1 TABLET BY MOUTH DAILY 90 tablet 3     No current facility-administered medications on file prior to visit.       Past Medical History:   Diagnosis Date   • Diverticulosis    • Fatty liver    • Gout    • Heart murmur    • Hemangioma    • Lipoma    • LVH (left ventricular hypertrophy)    • Obesity    • ELLA (obstructive sleep apnea)        Past Surgical History:   Procedure Laterality Date   • BREAST BIOPSY     • BREAST LUMPECTOMY     • BREAST SURGERY     • CATARACT EXTRACTION     • CHOLECYSTECTOMY     • COLONOSCOPY  2013   • COLONOSCOPY N/A 7/3/2019    Procedure: COLONOSCOPY to cecum with biopsy / polypectomy;  Surgeon: Royal Zuleta MD;  Location: [facility] ENDOSCOPY;  Service: General   • HYSTERECTOMY  1998    FOR BLEEDING   • THYROID SURGERY      nodule removed left thyroid   • THYROIDECTOMY  1998    total thyroidectomy       Family History   Problem Relation Age of Onset   • Hypertension Mother    • Other Mother         fall   • Hypertension Father    • Bone cancer Father    • Lung cancer Father    • Diabetes Brother    • Cancer Brother         sinus   • Hypertension Maternal Grandmother    • Stroke Maternal Grandmother    • Dementia Paternal Grandmother    • Alzheimer's disease Paternal Grandmother    • Stroke Paternal Grandfather        Social History  "    Socioeconomic History   • Marital status:    Tobacco Use   • Smoking status: Never Smoker   • Smokeless tobacco: Never Used   Substance and Sexual Activity   • Alcohol use: Yes     Comment: twice a month   • Drug use: No   • Sexual activity: Defer           The following portions of the patient's history were reviewed and updated as appropriate: problem list, allergies, current medications, past medical history, past family history, past social history and past surgical history.    Review of Systems    Immunization History   Administered Date(s) Administered   • COVID-19 (MODERNA) 1st, 2nd, 3rd Dose Only 01/22/2021, 02/19/2021, 09/04/2021   • Fluzone High Dose =>65 Years ([employer] ONLY) 10/25/2018, 10/03/2019, 10/12/2020   • Fluzone High-Dose 65+yrs 10/16/2021   • Hepatitis A 12/12/2017, 06/11/2018   • Hepatitis B 12/12/2017, 06/11/2018   • Hepatitis B Vaccine Adult IM 10/07/2019   • Pneumococcal Conjugate 13-Valent (PCV13) 05/18/2015   • Pneumococcal Polysaccharide (PPSV23) 01/01/2011, 12/03/2020   • Tdap 01/01/2013   • Zostavax 01/01/2012       Objective   Vitals:    06/13/22 1252   BP: 110/80   Temp: 97.5 °F (36.4 °C)   Weight: 118 kg (261 lb)   Height: 162 cm (63.78\")     Body mass index is 45.11 kg/m².  Physical Exam  Vitals reviewed.   Constitutional:       Appearance: She is well-developed.   HENT:      Head: Normocephalic and atraumatic.   Cardiovascular:      Rate and Rhythm: Normal rate and regular rhythm.      Heart sounds: Normal heart sounds, S1 normal and S2 normal.   Pulmonary:      Effort: Pulmonary effort is normal.      Breath sounds: Normal breath sounds.   Skin:     General: Skin is warm.   Neurological:      Mental Status: She is alert.   Psychiatric:         Behavior: Behavior normal.         Procedures    Assessment & Plan   Diagnoses and all orders for this visit:    1. Encounter for screening mammogram for malignant neoplasm of breast (Primary)  -     Mammo Screening Bilateral With " CAD; Future    2. Essential hypertension    3. Other hyperlipidemia    4. Morbidly obese (HCC)               Recc Shingrix. Encouraged her to get the Freestyle Melissa with endo. She is not interested in bariatric surgery. Discussed exercising up to 300 minutes per week. Reviewed cmp, flp, and cbc. LDL is at goal. HDL is low; discussed exercise to help.  Return in about 6 months (around 12/13/2022) for Medicare Wellness.

## 2022-07-10 DIAGNOSIS — M10.9 ACUTE GOUT OF RIGHT FOOT, UNSPECIFIED CAUSE: ICD-10-CM

## 2022-07-11 RX ORDER — ALLOPURINOL 300 MG/1
TABLET ORAL
Qty: 90 TABLET | Refills: 3 | Status: SHIPPED | OUTPATIENT
Start: 2022-07-11

## 2022-10-10 ENCOUNTER — APPOINTMENT (OUTPATIENT)
Dept: WOMENS IMAGING | Facility: HOSPITAL | Age: 72
End: 2022-10-10

## 2022-10-10 PROCEDURE — 77063 BREAST TOMOSYNTHESIS BI: CPT | Performed by: RADIOLOGY

## 2022-10-10 PROCEDURE — 77067 SCR MAMMO BI INCL CAD: CPT | Performed by: RADIOLOGY

## 2022-10-25 DIAGNOSIS — D64.9 ANEMIA, UNSPECIFIED TYPE: ICD-10-CM

## 2022-10-26 RX ORDER — FOLIC ACID 1 MG/1
1 TABLET ORAL DAILY
Qty: 90 TABLET | Refills: 1 | Status: SHIPPED | OUTPATIENT
Start: 2022-10-26

## 2022-12-08 DIAGNOSIS — I10 ESSENTIAL HYPERTENSION: ICD-10-CM

## 2022-12-08 RX ORDER — HYDROCHLOROTHIAZIDE 25 MG/1
TABLET ORAL
Qty: 90 TABLET | Refills: 1 | Status: SHIPPED | OUTPATIENT
Start: 2022-12-08

## 2022-12-09 ENCOUNTER — TELEPHONE (OUTPATIENT)
Dept: INTERNAL MEDICINE | Facility: CLINIC | Age: 72
End: 2022-12-09

## 2022-12-09 DIAGNOSIS — I10 ESSENTIAL HYPERTENSION: Primary | ICD-10-CM

## 2022-12-09 LAB
ALBUMIN SERPL-MCNC: 4.3 G/DL (ref 3.5–5.2)
ALBUMIN/GLOB SERPL: 1.7 G/DL
ALP SERPL-CCNC: 62 U/L (ref 39–117)
ALT SERPL-CCNC: 22 U/L (ref 1–33)
AST SERPL-CCNC: 24 U/L (ref 1–32)
BASOPHILS # BLD AUTO: 0.1 10*3/MM3 (ref 0–0.2)
BASOPHILS NFR BLD AUTO: 0.9 % (ref 0–1.5)
BILIRUB SERPL-MCNC: 0.3 MG/DL (ref 0–1.2)
BUN SERPL-MCNC: 16 MG/DL (ref 8–23)
BUN/CREAT SERPL: 15 (ref 7–25)
CALCIUM SERPL-MCNC: 9.8 MG/DL (ref 8.6–10.5)
CHLORIDE SERPL-SCNC: 99 MMOL/L (ref 98–107)
CO2 SERPL-SCNC: 25 MMOL/L (ref 22–29)
CREAT SERPL-MCNC: 1.07 MG/DL (ref 0.57–1)
EGFRCR SERPLBLD CKD-EPI 2021: 55 ML/MIN/1.73
EOSINOPHIL # BLD AUTO: 0.37 10*3/MM3 (ref 0–0.4)
EOSINOPHIL NFR BLD AUTO: 3.5 % (ref 0.3–6.2)
ERYTHROCYTE [DISTWIDTH] IN BLOOD BY AUTOMATED COUNT: 15.9 % (ref 12.3–15.4)
GLOBULIN SER CALC-MCNC: 2.5 GM/DL
GLUCOSE SERPL-MCNC: 199 MG/DL (ref 65–99)
HCT VFR BLD AUTO: 36.7 % (ref 34–46.6)
HGB BLD-MCNC: 11.4 G/DL (ref 12–15.9)
IMM GRANULOCYTES # BLD AUTO: 0.04 10*3/MM3 (ref 0–0.05)
IMM GRANULOCYTES NFR BLD AUTO: 0.4 % (ref 0–0.5)
LYMPHOCYTES # BLD AUTO: 2.79 10*3/MM3 (ref 0.7–3.1)
LYMPHOCYTES NFR BLD AUTO: 26.4 % (ref 19.6–45.3)
MCH RBC QN AUTO: 27.1 PG (ref 26.6–33)
MCHC RBC AUTO-ENTMCNC: 31.1 G/DL (ref 31.5–35.7)
MCV RBC AUTO: 87.4 FL (ref 79–97)
MONOCYTES # BLD AUTO: 0.74 10*3/MM3 (ref 0.1–0.9)
MONOCYTES NFR BLD AUTO: 7 % (ref 5–12)
NEUTROPHILS # BLD AUTO: 6.54 10*3/MM3 (ref 1.7–7)
NEUTROPHILS NFR BLD AUTO: 61.8 % (ref 42.7–76)
NRBC BLD AUTO-RTO: 0 /100 WBC (ref 0–0.2)
PLATELET # BLD AUTO: 366 10*3/MM3 (ref 140–450)
POTASSIUM SERPL-SCNC: 3.8 MMOL/L (ref 3.5–5.2)
PROT SERPL-MCNC: 6.8 G/DL (ref 6–8.5)
RBC # BLD AUTO: 4.2 10*6/MM3 (ref 3.77–5.28)
SODIUM SERPL-SCNC: 139 MMOL/L (ref 136–145)
WBC # BLD AUTO: 10.58 10*3/MM3 (ref 3.4–10.8)

## 2022-12-09 NOTE — TELEPHONE ENCOUNTER
Requesting a new Rx be sent to: [facility] mail order pharmacy: Metoprolol succinate 50 MG # 90, please advise [phone]

## 2022-12-15 ENCOUNTER — OFFICE VISIT (OUTPATIENT)
Dept: INTERNAL MEDICINE | Facility: CLINIC | Age: 72
End: 2022-12-15

## 2022-12-15 VITALS
SYSTOLIC BLOOD PRESSURE: 124 MMHG | WEIGHT: 273 LBS | HEIGHT: 64 IN | BODY MASS INDEX: 46.61 KG/M2 | TEMPERATURE: 98.1 F | DIASTOLIC BLOOD PRESSURE: 80 MMHG

## 2022-12-15 DIAGNOSIS — I10 ESSENTIAL HYPERTENSION: Chronic | ICD-10-CM

## 2022-12-15 DIAGNOSIS — Z00.00 ENCOUNTER FOR SUBSEQUENT ANNUAL WELLNESS VISIT (AWV) IN MEDICARE PATIENT: Primary | ICD-10-CM

## 2022-12-15 DIAGNOSIS — D36.9 TUBULAR ADENOMA: ICD-10-CM

## 2022-12-15 DIAGNOSIS — M25.511 ACUTE PAIN OF RIGHT SHOULDER: ICD-10-CM

## 2022-12-15 DIAGNOSIS — R09.89 LEFT CAROTID BRUIT: ICD-10-CM

## 2022-12-15 DIAGNOSIS — D64.9 ANEMIA, UNSPECIFIED TYPE: ICD-10-CM

## 2022-12-15 PROCEDURE — G0439 PPPS, SUBSEQ VISIT: HCPCS | Performed by: INTERNAL MEDICINE

## 2022-12-15 PROCEDURE — 1170F FXNL STATUS ASSESSED: CPT | Performed by: INTERNAL MEDICINE

## 2022-12-15 PROCEDURE — 96160 PT-FOCUSED HLTH RISK ASSMT: CPT | Performed by: INTERNAL MEDICINE

## 2022-12-15 PROCEDURE — 1160F RVW MEDS BY RX/DR IN RCRD: CPT | Performed by: INTERNAL MEDICINE

## 2022-12-15 NOTE — PROGRESS NOTES
The ABCs of the Annual Wellness Visit  Subsequent Medicare Wellness Visit    Chief Complaint   Patient presents with   • Medicare Wellness-subsequent      Subjective    History of Present Illness:  Lynnette Wright is a 72 y.o. female who presents for a Subsequent Medicare Wellness Visit.  She denies melena, hematochezia, chest pain or dyspnea. Her a1c is down to 8.1. She does not check her BP. Her right shoulder has been bothering her for 10 days when reaching for an item on a high shelf. She has tried biofreeze patch that helps. Her right third toe hurts this summer. She does recall hurting herself.  The following portions of the patient's history were reviewed and   updated as appropriate: allergies, current medications, past family history, past medical history, past social history, past surgical history and problem list.    Compared to one year ago, the patient feels her physical   health is the same.    Compared to one year ago, the patient feels her mental   health is the same.    Recent Hospitalizations:  She was not admitted to the hospital during the last year.       Current Medical Providers:  Patient Care Team:  Jacob Marroquin MD as PCP - General (Internal Medicine)  Milly Morales MD as Consulting Physician (Obstetrics and Gynecology)    Outpatient Medications Prior to Visit   Medication Sig Dispense Refill   • allopurinol (ZYLOPRIM) 300 MG tablet TAKE 1 TABLET EVERY DAY 90 tablet 3   • amLODIPine (NORVASC) 10 MG tablet TAKE 1 TABLET DAILY 90 tablet 1   • Ascorbic Acid (Vitamin C ER) 1000 MG tablet controlled-release Take  by mouth.     • atorvastatin (LIPITOR) 40 MG tablet Take 1 tablet by mouth Daily. 90 tablet 3   • benazepril (LOTENSIN) 40 MG tablet Take 1 tablet by mouth Daily. 90 tablet 3   • colchicine 0.6 MG tablet TAKE 2 TABLETS BY MOUTH ONCE THEN 1 TABLET BY MOUTH 1 HOUR LATER THEN 1 TABLET BY MOUTH EVERY DAY UNTIL SYMPTOMS RESOLVE 20 tablet 1   • folic acid (FOLVITE) 1 MG tablet TAKE 1  TABLET BY MOUTH DAILY 90 tablet 1   • hydroCHLOROthiazide (HYDRODIURIL) 25 MG tablet TAKE 1 TABLET EVERY DAY 90 tablet 1   • Insulin Aspart (NOVOLOG FLEXPEN SC) Inject  under the skin into the appropriate area as directed. 15 units in the am and 16u in the PM     • insulin glargine (LANTUS, SEMGLEE) 100 UNIT/ML injection Inject  under the skin into the appropriate area as directed Daily. 20 units in the am and 56 units in the pm     • Insulin Pen Needle 31G X 5 MM misc To use with Lantus Solorstar once daily 100 each 3   • Lancets 30G misc      • levothyroxine (SYNTHROID, LEVOTHROID) 200 MCG tablet      • Liraglutide (VICTOZA) 18 MG/3ML solution pen-injector injection Inject 1.2 mg under the skin into the appropriate area as directed Daily.     • metFORMIN (GLUCOPHAGE) 500 MG tablet Take 500 mg by mouth 2 (Two) Times a Day With Meals.     • metoprolol succinate XL (TOPROL-XL) 50 MG 24 hr tablet Take 1 tablet by mouth Daily. 90 tablet 1   • olmesartan (BENICAR) 40 MG tablet TAKE 1 TABLET DAILY 90 tablet 1   • TURMERIC PO Take  by mouth.     • vitamin B-12 (CYANOCOBALAMIN) 1000 MCG tablet Take 1,000 mcg by mouth Daily.     • glucose blood test strip 1 each by Other route As Needed. Use as instructed       No facility-administered medications prior to visit.       No opioid medication identified on active medication list. I have reviewed chart for other potential  high risk medication/s and harmful drug interactions in the elderly.          Aspirin is not on active medication list.  Aspirin use is not indicated based on review of current medical condition/s. Risk of harm outweighs potential benefits.  .    Patient Active Problem List   Diagnosis   • Other specified hypothyroidism   • Anemia   • B12 deficiency   • Essential hypertension   • Other hyperlipidemia   • Tubular adenoma   • Morbidly obese (HCC)     Advance Care Planning  Advance Directive is not on file.  ACP discussion was held with the patient during this  "visit. Patient does not have an advance directive, information provided.          Objective    Vitals:    12/15/22 1314   BP: 124/80   Temp: 98.1 °F (36.7 °C)   Weight: 124 kg (273 lb)   Height: 162 cm (63.78\")     Estimated body mass index is 47.18 kg/m² as calculated from the following:    Height as of this encounter: 162 cm (63.78\").    Weight as of this encounter: 124 kg (273 lb).    Class 3 Severe Obesity (BMI >=40). Obesity-related health conditions include the following: hypertension, diabetes mellitus and dyslipidemias. Obesity is unchanged. BMI is is above average; BMI management plan is completed. We discussed portion control and increasing exercise.      Does the patient have evidence of cognitive impairment? No    Physical Exam  Vitals reviewed.   Constitutional:       Appearance: She is well-developed.   HENT:      Head: Normocephalic and atraumatic.   Neck:      Comments: Bruit on the left. No bruit on right  Cardiovascular:      Rate and Rhythm: Normal rate and regular rhythm.      Heart sounds: Normal heart sounds, S1 normal and S2 normal.   Pulmonary:      Effort: Pulmonary effort is normal.      Breath sounds: Normal breath sounds.   Musculoskeletal:      Comments: Decreased ROM of right shoulder behind back    Right third toe is swollen but it is not.   Skin:     General: Skin is warm.   Neurological:      Mental Status: She is alert.   Psychiatric:         Behavior: Behavior normal.                 HEALTH RISK ASSESSMENT    Smoking Status:  Social History     Tobacco Use   Smoking Status Never   Smokeless Tobacco Never     Alcohol Consumption:  Social History     Substance and Sexual Activity   Alcohol Use Yes    Comment: twice a month     Fall Risk Screen:    STEADI Fall Risk Assessment was completed, and patient is at LOW risk for falls.Assessment completed on:12/15/2022    Depression Screening:  PHQ-2/PHQ-9 Depression Screening 12/15/2022   Retired PHQ-9 Total Score -   Retired Total Score - "   Little Interest or Pleasure in Doing Things 0-->not at all   Feeling Down, Depressed or Hopeless 0-->not at all   PHQ-9: Brief Depression Severity Measure Score 0       Health Habits and Functional and Cognitive Screening:  Functional & Cognitive Status 12/15/2022   Do you have difficulty preparing food and eating? No   Do you have difficulty bathing yourself, getting dressed or grooming yourself? No   Do you have difficulty using the toilet? No   Do you have difficulty moving around from place to place? No   Do you have trouble with steps or getting out of a bed or a chair? No   Current Diet Limited Junk Food   Dental Exam Up to date   Eye Exam Up to date   Exercise (times per week) 0 times per week   Current Exercises Include No Regular Exercise   Do you need help using the phone?  No   Are you deaf or do you have serious difficulty hearing?  No   Do you need help with transportation? No   Do you need help shopping? No   Do you need help preparing meals?  No   Do you need help with housework?  No   Do you need help with laundry? No   Do you need help taking your medications? No   Do you need help managing money? No   Do you ever drive or ride in a car without wearing a seat belt? No   Have you felt unusual stress, anger or loneliness in the last month? -   Who do you live with? -   If you need help, do you have trouble finding someone available to you? -   Have you been bothered in the last four weeks by sexual problems? -   Do you have difficulty concentrating, remembering or making decisions? -       Age-appropriate Screening Schedule:  Refer to the list below for future screening recommendations based on patient's age, sex and/or medical conditions. Orders for these recommended tests are listed in the plan section. The patient has been provided with a written plan.    Health Maintenance   Topic Date Due   • ZOSTER VACCINE (2 of 3) 02/26/2012   • TDAP/TD VACCINES (2 - Td or Tdap) 01/01/2023   • LIPID PANEL   09/02/2023   • DXA SCAN  11/30/2023   • MAMMOGRAM  10/10/2024   • INFLUENZA VACCINE  Completed              Assessment & Plan   CMS Preventative Services Quick Reference  Risk Factors Identified During Encounter  Immunizations Discussed/Encouraged: Vinny  The above risks/problems have been discussed with the patient.  Follow up actions/plans if indicated are seen below in the Assessment/Plan Section.  Pertinent information has been shared with the patient in the After Visit Summary.    Diagnoses and all orders for this visit:    1. Encounter for subsequent annual wellness visit (AWV) in Medicare patient (Primary)    2. Tubular adenoma  Comments:  Recent scope. She is to have it repeated in 3 years    3. Left carotid bruit  -     Duplex Carotid Ultrasound CAR    4. Acute pain of right shoulder  -     Ambulatory Referral to Physical Therapy    5. Anemia, unspecified type  -     Vitamin B12  -     Ferritin  -     CBC & Differential; Future    6. Essential hypertension  -     Basic Metabolic Panel; Future        Follow Up:   Return in about 6 months (around 6/15/2023), or 30 minutes, for Lab Before FUP, Lab Today.     An After Visit Summary and PPPS were made available to the patient.                 BP is good. Reviewed bmp and cbc. Eval anemia. Number of podiatrist.

## 2022-12-16 LAB
FERRITIN SERPL-MCNC: 63.2 NG/ML (ref 13–150)
VIT B12 SERPL-MCNC: 720 PG/ML (ref 211–946)

## 2022-12-21 ENCOUNTER — TREATMENT (OUTPATIENT)
Dept: PHYSICAL THERAPY | Facility: CLINIC | Age: 72
End: 2022-12-21

## 2022-12-21 DIAGNOSIS — M25.511 ACUTE PAIN OF RIGHT SHOULDER: Primary | ICD-10-CM

## 2022-12-21 DIAGNOSIS — R68.89 DECREASED FUNCTIONAL ACTIVITY TOLERANCE: ICD-10-CM

## 2022-12-21 DIAGNOSIS — R53.1 WEAKNESS: ICD-10-CM

## 2022-12-21 DIAGNOSIS — M25.611 DECREASED RANGE OF MOTION OF RIGHT SHOULDER: ICD-10-CM

## 2022-12-21 PROCEDURE — 97161 PT EVAL LOW COMPLEX 20 MIN: CPT | Performed by: PHYSICAL THERAPIST

## 2022-12-21 NOTE — PATIENT INSTRUCTIONS
Pt was educated on findings of evaluation, purpose of treatment and goals for therapy. Treatment options discussed and questions answered. Pt was educated on exercises, self treatment and pain relief techniques.

## 2022-12-21 NOTE — PROGRESS NOTES
Physical Therapy Initial Evaluation and Plan of Care    Patient: Lynnette Wright   : 1950  Diagnosis/ICD-10 Code:  Acute pain of right shoulder [M25.511]  Referring practitioner: Jacob Marroquin MD    Subjective Evaluation    History of Present Illness  Mechanism of injury: Pt reports he was reaching for groceries on top shelf with R UE and has had R shoulder pain since.       Patient Occupation: retired  Pain  Current pain ratin  At best pain ratin  At worst pain ratin  Location: R shoulder   Quality: discomfort, dull ache and tight  Relieving factors: change in position (Biofreeze )  Aggravating factors: overhead activity, sleeping, lifting and prolonged positioning    Social Support  Lives with: spouse    Hand dominance: right    Treatments  Previous treatment: medication  Current treatment: physical therapy  Patient Goals  Patient goals for therapy: decreased pain, increased motion, increased strength and independence with ADLs/IADLs             Objective          Palpation     Right Tenderness of the subscapularis, teres minor and upper trapezius.     Tenderness     Right Shoulder  Tenderness in the infraspinatus tendon, subscapularis tendon and supraspinatus tendon.     Active Range of Motion   Left Shoulder   Normal active range of motion  Flexion: 160 degrees   Abduction: 155 degrees   External rotation BTH: C7   Internal rotation BTB: T12     Right Shoulder   Flexion: 140 degrees with pain  Abduction: 140 degrees with pain  External rotation BTH: C5 with pain  Internal rotation BTB: sacrum with pain    Strength/Myotome Testing     Left Shoulder   Normal muscle strength    Right Shoulder     Planes of Motion   Flexion: 4   Abduction: 4-   External rotation at 0°: 4-   Internal rotation at 0°: 4-     Isolated Muscles   Supraspinatus: 4-     Tests     Right Shoulder   Positive empty can, Hawkin's and painful arc.     Functional Assessment     Comments  Dash: 47.43          Assessment & Plan      Assessment  Impairments: abnormal or restricted ROM, activity intolerance, impaired physical strength, lacks appropriate home exercise program and pain with function  Functional Limitations: carrying objects, lifting, sleeping, uncomfortable because of pain, reaching behind back, reaching overhead and unable to perform repetitive tasks  Assessment details: Pt presents with signs/symptoms consistent with diagnosis.  Will benefit from PT to address impairments as above to allow return to normal daily activities.  Prognosis: good    Goals  Plan Goals: Short Term Goals: 2-4 weeks. Patient will:  1. Be independent with initial HEP  2. Be instructed in posture and body mechanics.  3. Demonstrate full GH PROM to allow for progressing of therapy exercises.    Long Term Goals: 4-8 weeks. Pt will:  1. Exhibit (R) shoulder AROM to WFL to allow for reaching overhead and out (ABD) without pain limiting function.  2. Demonstrate improvedR UE MMT of >/= 4+/5 to allow for performance of ADL's/household management/recreational activities.  3. Pt able to reach overhead and lift 10# to allow for return to doing home/yard/recreational activities with min to no pain.  4. Report perceived disability </=10% based on QuickDASH    Plan  Therapy options: will be seen for skilled therapy services  Planned modality interventions: cryotherapy, TENS, thermotherapy (hydrocollator packs) and ultrasound  Planned therapy interventions: manual therapy, postural training, body mechanics training, flexibility, functional ROM exercises, home exercise program, stretching, strengthening, soft tissue mobilization, joint mobilization and therapeutic activities  Frequency: 2x week  Duration in weeks: 8  Treatment plan discussed with: patient        Manual Therapy:          mins  59456;  Therapeutic Exercise:          mins  38659;     Neuromuscular Oswaldo:         mins  68796;    Therapeutic Activity:           mins  37245;     Gait Training:             mins  92030;     Ultrasound:           mins  72238;    Electrical Stimulation:          mins  11666 ( );  Dry Needling           mins self-pay  Traction           mins 45227  Canalith Repositioning         mins 77589      Timed Treatment:      mins   Total Treatment:      30  mins    PT SIGNATURE: LiseSANTI Monsivais Lic #[id number]    DATE TREATMENT INITIATED: 12/21/2022    Initial Certification  Certification Period: 3/21/2023  I certify that the therapy services are furnished while this patient is under my care.  The services outlined above are required by this patient, and will be reviewed every 90 days.     PHYSICIAN: Jacob Marroquin MD      DATE:     Please sign and return via fax to [phone].. Thank you, [facility].

## 2022-12-26 DIAGNOSIS — I10 ESSENTIAL HYPERTENSION: ICD-10-CM

## 2022-12-27 ENCOUNTER — HOSPITAL ENCOUNTER (OUTPATIENT)
Dept: CARDIOLOGY | Facility: HOSPITAL | Age: 72
Discharge: HOME | End: 2022-12-27
Admitting: INTERNAL MEDICINE

## 2022-12-27 LAB
BH CV XLRA MEAS LEFT DIST CCA EDV: -12.3 CM/SEC
BH CV XLRA MEAS LEFT DIST CCA PSV: -52.1 CM/SEC
BH CV XLRA MEAS LEFT DIST ICA EDV: -26.9 CM/SEC
BH CV XLRA MEAS LEFT DIST ICA PSV: -86.2 CM/SEC
BH CV XLRA MEAS LEFT ICA/CCA RATIO: 1.65
BH CV XLRA MEAS LEFT MID ICA EDV: -13.7 CM/SEC
BH CV XLRA MEAS LEFT MID ICA PSV: -52.1 CM/SEC
BH CV XLRA MEAS LEFT PROX CCA EDV: 14.9 CM/SEC
BH CV XLRA MEAS LEFT PROX CCA PSV: 73.3 CM/SEC
BH CV XLRA MEAS LEFT PROX ECA EDV: 7.4 CM/SEC
BH CV XLRA MEAS LEFT PROX ECA PSV: 63.4 CM/SEC
BH CV XLRA MEAS LEFT PROX ICA EDV: -10.2 CM/SEC
BH CV XLRA MEAS LEFT PROX ICA PSV: -43.9 CM/SEC
BH CV XLRA MEAS LEFT PROX SCLA PSV: 84.1 CM/SEC
BH CV XLRA MEAS LEFT VERTEBRAL A EDV: 9.4 CM/SEC
BH CV XLRA MEAS LEFT VERTEBRAL A PSV: 42.7 CM/SEC
BH CV XLRA MEAS RIGHT DIST CCA EDV: -9.2 CM/SEC
BH CV XLRA MEAS RIGHT DIST CCA PSV: -50 CM/SEC
BH CV XLRA MEAS RIGHT DIST ICA EDV: -15.9 CM/SEC
BH CV XLRA MEAS RIGHT DIST ICA PSV: -68.6 CM/SEC
BH CV XLRA MEAS RIGHT ICA/CCA RATIO: 1.36
BH CV XLRA MEAS RIGHT MID ICA EDV: -14.6 CM/SEC
BH CV XLRA MEAS RIGHT MID ICA PSV: -47.8 CM/SEC
BH CV XLRA MEAS RIGHT PROX CCA EDV: -7 CM/SEC
BH CV XLRA MEAS RIGHT PROX CCA PSV: -53.6 CM/SEC
BH CV XLRA MEAS RIGHT PROX ECA EDV: -6.4 CM/SEC
BH CV XLRA MEAS RIGHT PROX ECA PSV: -72.7 CM/SEC
BH CV XLRA MEAS RIGHT PROX ICA EDV: -8.6 CM/SEC
BH CV XLRA MEAS RIGHT PROX ICA PSV: -36.5 CM/SEC
BH CV XLRA MEAS RIGHT PROX SCLA PSV: 146.2 CM/SEC
BH CV XLRA MEAS RIGHT VERTEBRAL A EDV: 12.6 CM/SEC
BH CV XLRA MEAS RIGHT VERTEBRAL A PSV: 46.1 CM/SEC
LEFT ARM BP: NORMAL MMHG
MAXIMAL PREDICTED HEART RATE: 147 BPM
RIGHT ARM BP: NORMAL MMHG
STRESS TARGET HR: 125 BPM

## 2022-12-27 PROCEDURE — 93880 EXTRACRANIAL BILAT STUDY: CPT

## 2022-12-27 RX ORDER — METOPROLOL SUCCINATE 50 MG/1
50 TABLET, EXTENDED RELEASE ORAL DAILY
Qty: 90 TABLET | Refills: 1 | Status: SHIPPED | OUTPATIENT
Start: 2022-12-27

## 2022-12-28 ENCOUNTER — TREATMENT (OUTPATIENT)
Dept: PHYSICAL THERAPY | Facility: CLINIC | Age: 72
End: 2022-12-28

## 2022-12-28 DIAGNOSIS — R68.89 DECREASED FUNCTIONAL ACTIVITY TOLERANCE: ICD-10-CM

## 2022-12-28 DIAGNOSIS — M25.611 DECREASED RANGE OF MOTION OF RIGHT SHOULDER: ICD-10-CM

## 2022-12-28 DIAGNOSIS — M25.511 ACUTE PAIN OF RIGHT SHOULDER: Primary | ICD-10-CM

## 2022-12-28 DIAGNOSIS — R53.1 WEAKNESS: ICD-10-CM

## 2022-12-28 PROCEDURE — 97110 THERAPEUTIC EXERCISES: CPT | Performed by: PHYSICAL THERAPIST

## 2022-12-28 NOTE — PROGRESS NOTES
Physical Therapy Daily Progress Note  Visit: 2    Subjective Lynnette Wright reports: her R shoulder has been feeling     Objective   See Exercise, Manual, and Modality Logs for complete treatment.     Assessment/Plan: good tolerance to exercises added today. Plan details: Progress ROM / strengthening / stabilization / functional activity as tolerated     Manual Therapy:          mins  10331;  Therapeutic Exercise:       25   mins  11147;     Neuromuscular Oswaldo:         mins  72431;    Therapeutic Activity:           mins  74493;     Gait Training:            mins  76895;     Ultrasound:           mins  90773;    Electrical Stimulation:          mins  84738 ( );  Dry Needling           mins self-pay  Traction           mins 18757  Canalith Repositioning         mins 05962      Timed Treatment: 25    mins   Total Treatment:    35    mins    Lise Loyola PT  [place] License #: [id number]    Physical Therapist

## 2022-12-29 DIAGNOSIS — I10 ESSENTIAL HYPERTENSION: ICD-10-CM

## 2022-12-29 DIAGNOSIS — M10.9 ACUTE GOUT OF RIGHT FOOT, UNSPECIFIED CAUSE: ICD-10-CM

## 2022-12-29 DIAGNOSIS — E78.5 HYPERLIPIDEMIA, UNSPECIFIED HYPERLIPIDEMIA TYPE: ICD-10-CM

## 2022-12-30 RX ORDER — BENAZEPRIL HYDROCHLORIDE 40 MG/1
TABLET, FILM COATED ORAL
Qty: 90 TABLET | Refills: 3 | Status: SHIPPED | OUTPATIENT
Start: 2022-12-30

## 2022-12-30 RX ORDER — ATORVASTATIN CALCIUM 40 MG/1
TABLET, FILM COATED ORAL
Qty: 90 TABLET | Refills: 3 | Status: SHIPPED | OUTPATIENT
Start: 2022-12-30

## 2022-12-30 RX ORDER — COLCHICINE 0.6 MG/1
TABLET ORAL
Qty: 20 TABLET | Refills: 1 | Status: SHIPPED | OUTPATIENT
Start: 2022-12-30

## 2023-01-04 ENCOUNTER — TREATMENT (OUTPATIENT)
Dept: PHYSICAL THERAPY | Facility: CLINIC | Age: 73
End: 2023-01-04
Payer: MEDICARE

## 2023-01-04 DIAGNOSIS — R53.1 WEAKNESS: ICD-10-CM

## 2023-01-04 DIAGNOSIS — M25.611 DECREASED RANGE OF MOTION OF RIGHT SHOULDER: ICD-10-CM

## 2023-01-04 DIAGNOSIS — M25.511 ACUTE PAIN OF RIGHT SHOULDER: Primary | ICD-10-CM

## 2023-01-04 DIAGNOSIS — R68.89 DECREASED FUNCTIONAL ACTIVITY TOLERANCE: ICD-10-CM

## 2023-01-04 PROCEDURE — 97110 THERAPEUTIC EXERCISES: CPT | Performed by: PHYSICAL THERAPIST

## 2023-01-04 NOTE — PROGRESS NOTES
Physical Therapy Daily Progress Note  Visit: 3    Subjective Lynnette Wright reports: her R shoulder is improving, has better tolerance to trying hair and reaching behind her back. Reports referral pain down R lateral arm     Objective   See Exercise, Manual, and Modality Logs for complete treatment.     Assessment/Plan: more fluid ROM, noted improved IR ROM. Plan details: Progress ROM / strengthening / stabilization / functional activity as tolerated     Manual Therapy:          mins  37112;  Therapeutic Exercise:    30      mins  64906;     Neuromuscular Oswaldo:         mins  99725;    Therapeutic Activity:           mins  72694;     Gait Training:            mins  87356;     Ultrasound:           mins  20672;    Electrical Stimulation:          mins  88354 ( );  Dry Needling           mins self-pay  Traction           mins 30324  Canalith Repositioning         mins 28918      Timed Treatment:   30   mins   Total Treatment:     40   mins    SANTI Majano License #: [id number]    Physical Therapist

## 2023-01-09 ENCOUNTER — TREATMENT (OUTPATIENT)
Dept: PHYSICAL THERAPY | Facility: CLINIC | Age: 73
End: 2023-01-09
Payer: MEDICARE

## 2023-01-09 DIAGNOSIS — M25.511 ACUTE PAIN OF RIGHT SHOULDER: Primary | ICD-10-CM

## 2023-01-09 DIAGNOSIS — R68.89 DECREASED FUNCTIONAL ACTIVITY TOLERANCE: ICD-10-CM

## 2023-01-09 DIAGNOSIS — M25.611 DECREASED RANGE OF MOTION OF RIGHT SHOULDER: ICD-10-CM

## 2023-01-09 DIAGNOSIS — R53.1 WEAKNESS: ICD-10-CM

## 2023-01-09 PROCEDURE — 97110 THERAPEUTIC EXERCISES: CPT | Performed by: PHYSICAL THERAPIST

## 2023-01-09 NOTE — PROGRESS NOTES
Physical Therapy Daily Progress Note  Visit: 4    Subjective Lynnette Wright reports: her shoulder is getting better. Was able to work on cleaning out her pantry     Objective   See Exercise, Manual, and Modality Logs for complete treatment.     Assessment/Plan: Compliant/cooperative with current rehab efforts.  Benefits from verbal/tactile cues to ensure correct exercise performance/technique, hold time and position. Plan details: Progress ROM / strengthening / stabilization / functional activity as tolerated     Manual Therapy:          mins  08853;  Therapeutic Exercise:   30       mins  23332;     Neuromuscular Oswaldo:         mins  05267;    Therapeutic Activity:           mins  23535;     Gait Training:            mins  53791;     Ultrasound:           mins  31984;    Electrical Stimulation:          mins  37042 ( );  Dry Needling           mins self-pay  Traction           mins 63167  Canalith Repositioning         mins 08967      Timed Treatment:  30    mins   Total Treatment:    40   mins    Lise Loyola PT  [place] License #: [id number]    Physical Therapist

## 2023-01-11 ENCOUNTER — TREATMENT (OUTPATIENT)
Dept: PHYSICAL THERAPY | Facility: CLINIC | Age: 73
End: 2023-01-11
Payer: MEDICARE

## 2023-01-11 DIAGNOSIS — M25.611 DECREASED RANGE OF MOTION OF RIGHT SHOULDER: ICD-10-CM

## 2023-01-11 DIAGNOSIS — R53.1 WEAKNESS: ICD-10-CM

## 2023-01-11 DIAGNOSIS — M25.511 ACUTE PAIN OF RIGHT SHOULDER: Primary | ICD-10-CM

## 2023-01-11 DIAGNOSIS — R68.89 DECREASED FUNCTIONAL ACTIVITY TOLERANCE: ICD-10-CM

## 2023-01-11 PROCEDURE — 97112 NEUROMUSCULAR REEDUCATION: CPT | Performed by: PHYSICAL THERAPIST

## 2023-01-11 PROCEDURE — 97110 THERAPEUTIC EXERCISES: CPT | Performed by: PHYSICAL THERAPIST

## 2023-01-11 NOTE — PROGRESS NOTES
Physical Therapy Daily Progress Note  Visit: 5    Subjective Lynnette Wright reports: her R shoulder is feeling better, feels like she is making good progress     Objective   See Exercise, Manual, and Modality Logs for complete treatment.     Assessment/Plan: Compliant/cooperative with current rehab efforts.  Benefits from verbal/tactile cues to ensure correct exercise performance/technique, hold time and position. Plan details: Progress ROM / strengthening / stabilization / functional activity as tolerated     Manual Therapy:          mins  73501;  Therapeutic Exercise:    25      mins  69092;     Neuromuscular Oswaldo:    15     mins  89419;    Therapeutic Activity:           mins  31763;     Gait Training:            mins  21914;     Ultrasound:           mins  98939;    Electrical Stimulation:          mins  54823 ( );  Dry Needling           mins self-pay  Traction           mins 83474  Canalith Repositioning         mins 20222      Timed Treatment:   40   mins   Total Treatment:   50     mins    SANTI Majano License #: [id number]    Physical Therapist

## 2023-01-18 ENCOUNTER — TREATMENT (OUTPATIENT)
Dept: PHYSICAL THERAPY | Facility: CLINIC | Age: 73
End: 2023-01-18
Payer: MEDICARE

## 2023-01-18 DIAGNOSIS — M25.511 ACUTE PAIN OF RIGHT SHOULDER: Primary | ICD-10-CM

## 2023-01-18 DIAGNOSIS — M25.611 DECREASED RANGE OF MOTION OF RIGHT SHOULDER: ICD-10-CM

## 2023-01-18 DIAGNOSIS — R68.89 DECREASED FUNCTIONAL ACTIVITY TOLERANCE: ICD-10-CM

## 2023-01-18 DIAGNOSIS — R53.1 WEAKNESS: ICD-10-CM

## 2023-01-18 PROCEDURE — 97112 NEUROMUSCULAR REEDUCATION: CPT | Performed by: PHYSICAL THERAPIST

## 2023-01-18 PROCEDURE — 97110 THERAPEUTIC EXERCISES: CPT | Performed by: PHYSICAL THERAPIST

## 2023-01-18 NOTE — PROGRESS NOTES
Physical Therapy Daily Progress Note/ DC  Visit: 6    Subjective Lynnette Wright reports: her R shoulder much  Better, has no pain and motion is good     Objective   See Exercise, Manual, and Modality Logs for complete treatment.     Assessment/Plan: pt has made excellent  Progress. DC PT and continue HEP. Follow up if symptoms return.     Manual Therapy:          mins  07598;  Therapeutic Exercise:    23      mins  87033;     Neuromuscular Oswaldo:   15      mins  62416;    Therapeutic Activity:           mins  35673;     Gait Training:            mins  14529;     Ultrasound:          mins  73621;    Electrical Stimulation:          mins  76504 ( );  Dry Needling           mins self-pay  Traction           mins 24353  Canalith Repositioning         mins 91931      Timed Treatment:  38    mins   Total Treatment:     38   mins    Lise Loyola PT  [place] License #: [id number]    Physical Therapist

## 2023-05-25 NOTE — PROGRESS NOTES
[facility]  New Problem      Patient Name: Lynnette Wright  : 1950  Primary Care Physician: Jacob Marroquin MD        Chief Complaint: Right elbow pain right wrist pain    HPI:   Lynnette Wright is a 72 y.o. year old who presents today for evaluation of right elbow and right wrist pain.  Patient reports an injury that occurred approximately 1 week ago, she had an accidental fall onto an outstretched arm.  She fell onto some concrete.  She was hopeful her symptoms would improve however when they did not she proceeded to the urgent care 2 days ago.  She had x-rays performed of the knee as well as the right wrist and elbow.  She continues to have some elbow and wrist pain, a radial head fracture was identified on the elbow films wrist x-ray showed some arthritis but no obvious fracture.  She was placed in a sling, given a cock up wrist splint.  She is here today for further evaluation and treatment.    Date of injury was May 17.  Pain is 8 out of 10 aching.  She is noticed swelling stiffness.  Worse with activities.  She is managing symptoms with over-the-counter medications and bracing.    Past Medical/Surgical, Social and Family History:  I have reviewed and/or updated pertinent history as noted in the medical record including:  Past Medical History:   Diagnosis Date   • Diverticulosis    • Fatty liver    • Gout    • Heart murmur    • Hemangioma    • Lipoma    • LVH (left ventricular hypertrophy)    • Obesity    • ELLA (obstructive sleep apnea)      Past Surgical History:   Procedure Laterality Date   • BREAST BIOPSY     • BREAST LUMPECTOMY     • BREAST SURGERY     • CATARACT EXTRACTION     • CHOLECYSTECTOMY     • COLONOSCOPY     • COLONOSCOPY N/A 2019    Procedure: COLONOSCOPY to cecum with biopsy / polypectomy;  Surgeon: Royal Zuleta MD;  Location: [facility] ENDOSCOPY;  Service: General   • COLONOSCOPY  2022   • HYSTERECTOMY      FOR BLEEDING   • THYROID SURGERY      nodule removed  left thyroid   • THYROIDECTOMY  1998    total thyroidectomy     Social History     Occupational History   • Not on file   Tobacco Use   • Smoking status: Never   • Smokeless tobacco: Never   Vaping Use   • Vaping Use: Never used   Substance and Sexual Activity   • Alcohol use: Yes     Comment: twice a month   • Drug use: No   • Sexual activity: Defer          Allergies:   Allergies   Allergen Reactions   • Contrast Dye (Echo Or Unknown Ct/Mr) Hives       Medications:   Home Medications:  Current Outpatient Medications on File Prior to Visit   Medication Sig   • allopurinol (ZYLOPRIM) 300 MG tablet TAKE 1 TABLET EVERY DAY   • amLODIPine (NORVASC) 10 MG tablet TAKE 1 TABLET DAILY   • Ascorbic Acid (Vitamin C ER) 1000 MG tablet controlled-release Take  by mouth.   • atorvastatin (LIPITOR) 40 MG tablet TAKE 1 TABLET EVERY DAY   • benazepril (LOTENSIN) 40 MG tablet TAKE 1 TABLET EVERY DAY   • colchicine 0.6 MG tablet TAKE 2 TABLETS BY MOUTH THEN TAKE 1 TABLET BY MOUTH 1 HOUR LATER, THEN 1 TABLET EVERY DAY UNTIL SYMPTOMS RESOLVE   • empagliflozin (JARDIANCE) 10 MG tablet tablet Take 1 tablet by mouth Daily.   • folic acid (FOLVITE) 1 MG tablet TAKE 1 TABLET BY MOUTH DAILY   • hydroCHLOROthiazide (HYDRODIURIL) 25 MG tablet TAKE 1 TABLET EVERY DAY   • Insulin Aspart (NOVOLOG FLEXPEN SC) Inject  under the skin into the appropriate area as directed. 15 units in the am and 16u in the PM   • insulin glargine (LANTUS, SEMGLEE) 100 UNIT/ML injection Inject  under the skin into the appropriate area as directed Daily. 20 units in the am and 56 units in the pm   • Insulin Pen Needle 31G X 5 MM misc To use with Lantus Solorstar once daily   • Lancets 30G misc    • levothyroxine (SYNTHROID, LEVOTHROID) 200 MCG tablet    • Liraglutide (VICTOZA) 18 MG/3ML solution pen-injector injection Inject 1.2 mg under the skin into the appropriate area as directed Daily.   • metFORMIN (GLUCOPHAGE) 500 MG tablet Take 1 tablet by mouth 2 (Two) Times  a Day With Meals.   • metoprolol succinate XL (TOPROL-XL) 50 MG 24 hr tablet TAKE 1 TABLET BY MOUTH DAILY   • NovoLOG FlexPen 100 UNIT/ML solution pen-injector sc pen    • olmesartan (BENICAR) 40 MG tablet TAKE 1 TABLET DAILY   • TURMERIC PO Take  by mouth.   • vitamin B-12 (CYANOCOBALAMIN) 1000 MCG tablet Take 1 tablet by mouth Daily.     No current facility-administered medications on file prior to visit.         ROS:  14 point review of systems was negative except as listed in the HPI.    Physical Exam:   72 y.o. female  Body mass index is 46.46 kg/m²., 122 kg (268 lb 12.8 oz)  Vitals:    05/26/23 1053   Temp: 97.8 °F (36.6 °C)     General: Alert, cooperative, appears well and in no observable distress. Appears stated age and BMI as listed above.  HEENT: Normocephalic, atraumatic on external visual inspection.  CV: No significant peripheral edema.  Respiratory: Normal respiratory effort.  Skin: Warm & well perfused; appropriate skin turgor.  Psych: Appropriate mood & affect.  Neuro: Gross sensation and motor intact in affected extremity/extremities.  Vascular: Peripheral pulses palpable in affected extremity/extremities.     MSK Exam:  Physical exam of the right elbow reveals no obvious deformities no wounds.  No significant swelling.  She has full range of motion which is painless except for mild pain with supination of the elbow.  She has some point tenderness laterally over the radial head.    Physical exam of the wrist reveals some swelling over the volar aspect of the wrist.  No obvious deformity of the wrist.  Mild tenderness over the distal radius at the joint line.  She does lack some terminal motion with pain at endpoints.   She is able to extend her thumb make a fist neurovascularly intact distally in the right upper extremity.  Good capillary refill and pulses.    Radiology:    Images were taken prior to the visit today and were independently reviewed by me. I reviewed the imaging from urgent care  which was taken 2 days prior.  She does have essentially nondisplaced radial head fracture which appears to be in good position joint is congruent.  The wrist shows degenerative changes no obvious sign of fracture.  She has some arthritis also at the base of the thumb.    Procedure:   N/A    Misc. Data/Labs: N/A    Assessment & Plan:    ICD-10-CM ICD-9-CM   1. Closed nondisplaced fracture of head of right radius, initial encounter  S52.124A 813.05   2. Wrist sprain, right, subsequent encounter  S63.501D V58.89     842.00     No orders of the defined types were placed in this encounter.    No orders of the defined types were placed in this encounter.      Is a 72-year-old female with an accidental fall approximately 9 days ago.  She was evaluated 2 days ago with x-rays in the urgent care center that identified a nondisplaced right radial head fracture.  She has wrist imaging suggestive of degenerative changes no obvious fracture but I would have a low threshold to consider that there could be an occult fracture.  We will keep her in a cock-up wrist splint although I did give her 1 that fits her arm better.  She said it did feel more supportive.  We will have her continue in the sling particularly when she is out and about just to protect herself.  I do think she can come out of the sling for gentle elbow range of motion no lifting pushing pulling or carrying.  I will see her back in 1 week we will get new x-rays to check for stability.  If there is any concern for wrist fracture and x-rays are normal I will consider getting an MRI of her wrist.  I suspect we will treat the radial head fracture conservatively without the need for surgery.    Return in about 1 week (around 6/2/2023).    Patient encouraged to call with questions or concerns prior to follow up.  Recommend ICE and/or HEAT PRN as discussed.  Will discuss with attending physician as needed.  Consider additional referrals, work up and/or advanced imaging as  indicated or if patient fails to respond to conservative care.        Steve Estrada, APRN

## 2023-05-26 ENCOUNTER — OFFICE VISIT (OUTPATIENT)
Dept: ORTHOPEDIC SURGERY | Facility: CLINIC | Age: 73
End: 2023-05-26
Payer: MEDICARE

## 2023-05-26 VITALS — WEIGHT: 268.8 LBS | BODY MASS INDEX: 45.89 KG/M2 | TEMPERATURE: 97.8 F | HEIGHT: 64 IN

## 2023-05-26 DIAGNOSIS — S63.501D WRIST SPRAIN, RIGHT, SUBSEQUENT ENCOUNTER: ICD-10-CM

## 2023-05-26 DIAGNOSIS — S52.124A CLOSED NONDISPLACED FRACTURE OF HEAD OF RIGHT RADIUS, INITIAL ENCOUNTER: Primary | ICD-10-CM

## 2023-06-01 NOTE — PROGRESS NOTES
[facility]              Follow Up      Patient Name: Lynnette Wright  : 1950  Primary Care Physician: Jacob Marroquin MD        Chief Complaint:  Right elbow pain right wrist pain  HPI:   Lynnette Wright is a 72 y.o. year old who presents today for evaluation of Right elbow pain right wrist pain.  Injury occurred on May 17th when she fell onto an outstretched arm. She sustained a radial head fracture and suspected wrist sprain. She is here today with new x-rays for further evaluation and treatment.     She has been limiting WB as recommended. Still has pain more so in the wrist and distal forearm than the elbow. Overall she is improving. She is wearing the wrist brace. Using OTC mediations for pain.    Answers for HPI/ROS submitted by the patient on 2023  Please describe your symptoms.: Follow up to  visit regarding x-rays of right elbow and wrist.  Have you had these symptoms before?: Yes  How long have you been having these symptoms?: 1-2 weeks  Please list any medications you are currently taking for this condition.: Aleve  Please describe any probable cause for these symptoms. : Fall  What is the primary reason for your visit?: Other      Past Medical/Surgical, Social and Family History:  I have reviewed and/or updated pertinent history as noted in the medical record including:  Past Medical History:   Diagnosis Date   • Diverticulosis    • Fatty liver    • Gout    • Heart murmur    • Hemangioma    • Lipoma    • LVH (left ventricular hypertrophy)    • Obesity    • ELLA (obstructive sleep apnea)      Past Surgical History:   Procedure Laterality Date   • BREAST BIOPSY     • BREAST LUMPECTOMY     • BREAST SURGERY     • CATARACT EXTRACTION     • CHOLECYSTECTOMY     • COLONOSCOPY     • COLONOSCOPY N/A 2019    Procedure: COLONOSCOPY to cecum with biopsy / polypectomy;  Surgeon: Royal Zuleta MD;  Location: [facility] ENDOSCOPY;  Service: General   • COLONOSCOPY  2022   •  HYSTERECTOMY  1998    FOR BLEEDING   • THYROID SURGERY      nodule removed left thyroid   • THYROIDECTOMY  1998    total thyroidectomy     Social History     Occupational History   • Not on file   Tobacco Use   • Smoking status: Never   • Smokeless tobacco: Never   Vaping Use   • Vaping Use: Never used   Substance and Sexual Activity   • Alcohol use: Yes     Comment: twice a month   • Drug use: No   • Sexual activity: Defer          Allergies:   Allergies   Allergen Reactions   • Contrast Dye (Echo Or Unknown Ct/Mr) Hives       Medications:   Home Medications:  Current Outpatient Medications on File Prior to Visit   Medication Sig   • allopurinol (ZYLOPRIM) 300 MG tablet TAKE 1 TABLET EVERY DAY   • amLODIPine (NORVASC) 10 MG tablet TAKE 1 TABLET DAILY   • Ascorbic Acid (Vitamin C ER) 1000 MG tablet controlled-release Take  by mouth.   • atorvastatin (LIPITOR) 40 MG tablet TAKE 1 TABLET EVERY DAY   • benazepril (LOTENSIN) 40 MG tablet TAKE 1 TABLET EVERY DAY   • colchicine 0.6 MG tablet TAKE 2 TABLETS BY MOUTH THEN TAKE 1 TABLET BY MOUTH 1 HOUR LATER, THEN 1 TABLET EVERY DAY UNTIL SYMPTOMS RESOLVE   • empagliflozin (JARDIANCE) 10 MG tablet tablet Take 1 tablet by mouth Daily.   • folic acid (FOLVITE) 1 MG tablet TAKE 1 TABLET BY MOUTH DAILY   • hydroCHLOROthiazide (HYDRODIURIL) 25 MG tablet TAKE 1 TABLET EVERY DAY   • Insulin Aspart (NOVOLOG FLEXPEN SC) Inject  under the skin into the appropriate area as directed. 15 units in the am and 16u in the PM   • insulin glargine (LANTUS, SEMGLEE) 100 UNIT/ML injection Inject  under the skin into the appropriate area as directed Daily. 20 units in the am and 56 units in the pm   • Insulin Pen Needle 31G X 5 MM misc To use with Lantus Solorstar once daily   • Lancets 30G misc    • levothyroxine (SYNTHROID, LEVOTHROID) 200 MCG tablet    • Liraglutide (VICTOZA) 18 MG/3ML solution pen-injector injection Inject 1.2 mg under the skin into the appropriate area as directed Daily.   •  metFORMIN (GLUCOPHAGE) 500 MG tablet Take 1 tablet by mouth 2 (Two) Times a Day With Meals.   • metoprolol succinate XL (TOPROL-XL) 50 MG 24 hr tablet TAKE 1 TABLET BY MOUTH DAILY   • NovoLOG FlexPen 100 UNIT/ML solution pen-injector sc pen    • olmesartan (BENICAR) 40 MG tablet TAKE 1 TABLET DAILY   • TURMERIC PO Take  by mouth.   • vitamin B-12 (CYANOCOBALAMIN) 1000 MCG tablet Take 1 tablet by mouth Daily.     No current facility-administered medications on file prior to visit.         ROS:  ROS negative except as listed in the HPI.    Physical Exam:   72 y.o. female  Body mass index is 45.8 kg/m²., 121 kg (266 lb 12.8 oz)  Vitals:    06/02/23 1351   Temp: 97.6 °F (36.4 °C)     General: Alert, cooperative, appears well and in no observable distress. Appears stated age and BMI as listed above.  HEENT: Normocephalic, atraumatic on external visual inspection.  CV: No significant peripheral edema.  Respiratory: Normal respiratory effort.  Skin: Warm & well perfused; appropriate skin turgor.  Psych: Appropriate mood & affect.  Neuro: Gross sensation and motor intact in affected extremity/extremities.  Vascular: Peripheral pulses palpable in affected extremity/extremities.     MSK Exam:  Physical exam of the right elbow reveals no obvious deformities no wounds.  No significant swelling.  She has full range of motion which is painless except for mild pain with supination of the elbow.  She has some point tenderness laterally over the radial head.     Physical exam of the wrist reveals some swelling over the volar aspect of the wrist.  No obvious deformity of the wrist.  Mild tenderness over the distal radius at the joint line.  She does lack some terminal motion with pain at endpoints, overall mild limitation with mild pain.   She is able to extend her thumb make a fist neurovascularly intact distally in the right upper extremity.  Good capillary refill and pulses.        Radiology:    The following X-rays were  ordered/reviewed today to evaluate the patient's symptoms: Wrist: 3 views of the right wrist show degenerative changes of the wrist, especially base of the thumb. No obvious fracture identified. Stable in comparison with prior films at .. Elbow: 2 views of the right elbow show a fracture of the radial head/neck of the R elbow without significant displacement. She has some well cotrticated denisties in the soft tissues of the elbow which appear similar to prior films. She has had some slight settling of the fracture but no significant changes and overall stable in alignment..    Procedure:   N/A      Misc. Data/Labs: N/A    Assessment & Plan:    ICD-10-CM ICD-9-CM   1. Closed nondisplaced fracture of head of right radius with routine healing, subsequent encounter  S52.124D V54.12   2. Wrist sprain, right, subsequent encounter  S63.501D V58.89     842.00   3. Pain  R52 780.96     No orders of the defined types were placed in this encounter.    Orders Placed This Encounter   Procedures   • XR Elbow 2 View Right   • XR Wrist 3+ View Right         This is a 73 y/o female with R radial head fx and suspected R wrist sprain. Will continue to treat this conservatively. I would like her to continue in the wrist brace for another two weeks. She can continue gentle elbow ROM but no lifting, pushing, pulling or carrying. I will get new images at follow up and will start to increase activities at that time if she is stable/improved. Con't OTC meds as needed, ice and voltaren PRN.    Return in about 2 weeks (around 6/16/2023) for with x-rays of wrist and elbow for recheck..    Patient encouraged to call with questions or concerns prior to follow up.  Recommend ICE and/or HEAT PRN as discussed.  Will discuss with attending physician as needed.  Consider additional referrals, work up and/or advanced imaging as indicated or if patient fails to respond to conservative care.        Steve Estrada, DAQUAN      Dictated Utilizing  [employer] Dictation

## 2023-06-02 ENCOUNTER — OFFICE VISIT (OUTPATIENT)
Dept: ORTHOPEDIC SURGERY | Facility: CLINIC | Age: 73
End: 2023-06-02

## 2023-06-02 VITALS — HEIGHT: 64 IN | TEMPERATURE: 97.6 F | WEIGHT: 266.8 LBS | BODY MASS INDEX: 45.55 KG/M2

## 2023-06-02 DIAGNOSIS — S52.124D CLOSED NONDISPLACED FRACTURE OF HEAD OF RIGHT RADIUS WITH ROUTINE HEALING, SUBSEQUENT ENCOUNTER: Primary | ICD-10-CM

## 2023-06-02 DIAGNOSIS — R52 PAIN: ICD-10-CM

## 2023-06-02 DIAGNOSIS — S63.501D WRIST SPRAIN, RIGHT, SUBSEQUENT ENCOUNTER: ICD-10-CM

## 2023-06-09 LAB — HBA1C MFR BLD: 7.6 %

## 2023-06-14 ENCOUNTER — OFFICE VISIT (OUTPATIENT)
Dept: ORTHOPEDIC SURGERY | Facility: CLINIC | Age: 73
End: 2023-06-14
Payer: MEDICARE

## 2023-06-14 VITALS — BODY MASS INDEX: 43.07 KG/M2 | HEIGHT: 66 IN | WEIGHT: 268 LBS

## 2023-06-14 DIAGNOSIS — S63.501D WRIST SPRAIN, RIGHT, SUBSEQUENT ENCOUNTER: ICD-10-CM

## 2023-06-14 DIAGNOSIS — R52 PAIN: ICD-10-CM

## 2023-06-14 DIAGNOSIS — S52.124D CLOSED NONDISPLACED FRACTURE OF HEAD OF RIGHT RADIUS WITH ROUTINE HEALING, SUBSEQUENT ENCOUNTER: Primary | ICD-10-CM

## 2023-06-15 NOTE — PROGRESS NOTES
[facility]              Follow Up      Patient Name: Lynnette Wright  : 1950  Primary Care Physician: Jacob Marroquin MD        Chief Complaint:  R elbow, R wrist pain/injury    Answers for HPI/ROS submitted by the patient on 2023  Please describe your symptoms.: Folliw up to x-rays showing fractured rlbow  Have you had these symptoms before?: Yes  How long have you been having these symptoms?: Greater than 2 weeks  Please list any medications you are currently taking for this condition.: Aleve  Please describe any probable cause for these symptoms. : Fall  What is the primary reason for your visit?: Other      HPI:   Lynnette Wright is a 72 y.o. year old who presents today for evaluation of R elbow and wrist pain after a fall on 23.  She is now about a month out from her injury she sustained a radial neck fracture and right wrist sprain.  There was a little concern for an occult wrist fracture but her pain was pretty minimal she had good range of motion so we have been treating this conservatively and check some x-rays.  She is here today with new x-rays for further evaluation and treatment.  She still wearing her wrist splint and says that her elbow is doing fine.  She still has some pain in the forearm with rotation.  Overall she feels she is improving.      Past Medical/Surgical, Social and Family History:  I have reviewed and/or updated pertinent history as noted in the medical record including:  Past Medical History:   Diagnosis Date   • Diverticulosis    • Fatty liver    • Gout    • Heart murmur    • Hemangioma    • Hip arthrosis    • Lipoma    • LVH (left ventricular hypertrophy)    • Obesity    • ELLA (obstructive sleep apnea)      Past Surgical History:   Procedure Laterality Date   • BREAST BIOPSY     • BREAST LUMPECTOMY     • BREAST SURGERY     • CATARACT EXTRACTION     • CHOLECYSTECTOMY     • COLONOSCOPY     • COLONOSCOPY N/A 2019    Procedure: COLONOSCOPY to cecum with  biopsy / polypectomy;  Surgeon: Royal Zuleta MD;  Location: [facility] ENDOSCOPY;  Service: General   • COLONOSCOPY  11/21/2022   • HYSTERECTOMY  1998    FOR BLEEDING   • THYROID SURGERY      nodule removed left thyroid   • THYROIDECTOMY  1998    total thyroidectomy     Social History     Occupational History   • Not on file   Tobacco Use   • Smoking status: Never   • Smokeless tobacco: Never   Vaping Use   • Vaping Use: Never used   Substance and Sexual Activity   • Alcohol use: Yes     Comment: twice a month   • Drug use: No   • Sexual activity: Defer          Allergies:   Allergies   Allergen Reactions   • Contrast Dye (Echo Or Unknown Ct/Mr) Hives       Medications:   Home Medications:  Current Outpatient Medications on File Prior to Visit   Medication Sig   • allopurinol (ZYLOPRIM) 300 MG tablet TAKE 1 TABLET EVERY DAY   • amLODIPine (NORVASC) 10 MG tablet TAKE 1 TABLET DAILY   • Ascorbic Acid (Vitamin C ER) 1000 MG tablet controlled-release Take  by mouth.   • atorvastatin (LIPITOR) 40 MG tablet TAKE 1 TABLET EVERY DAY   • benazepril (LOTENSIN) 40 MG tablet TAKE 1 TABLET EVERY DAY   • colchicine 0.6 MG tablet TAKE 2 TABLETS BY MOUTH THEN TAKE 1 TABLET BY MOUTH 1 HOUR LATER, THEN 1 TABLET EVERY DAY UNTIL SYMPTOMS RESOLVE   • empagliflozin (JARDIANCE) 10 MG tablet tablet Take 1 tablet by mouth Daily.   • folic acid (FOLVITE) 1 MG tablet TAKE 1 TABLET BY MOUTH DAILY   • hydroCHLOROthiazide (HYDRODIURIL) 25 MG tablet TAKE 1 TABLET EVERY DAY   • Insulin Aspart (NOVOLOG FLEXPEN SC) Inject  under the skin into the appropriate area as directed. 15 units in the am and 16u in the PM   • insulin glargine (LANTUS, SEMGLEE) 100 UNIT/ML injection Inject  under the skin into the appropriate area as directed Daily. 20 units in the am and 56 units in the pm   • Insulin Pen Needle 31G X 5 MM misc To use with Lantus Solorstar once daily   • Lancets 30G misc    • levothyroxine (SYNTHROID, LEVOTHROID) 200 MCG tablet    •  Liraglutide (VICTOZA) 18 MG/3ML solution pen-injector injection Inject 1.2 mg under the skin into the appropriate area as directed Daily.   • metFORMIN (GLUCOPHAGE) 500 MG tablet Take 1 tablet by mouth 2 (Two) Times a Day With Meals.   • metoprolol succinate XL (TOPROL-XL) 50 MG 24 hr tablet TAKE 1 TABLET BY MOUTH DAILY   • NovoLOG FlexPen 100 UNIT/ML solution pen-injector sc pen    • olmesartan (BENICAR) 40 MG tablet TAKE 1 TABLET DAILY   • TURMERIC PO Take  by mouth.   • vitamin B-12 (CYANOCOBALAMIN) 1000 MCG tablet Take 1 tablet by mouth Daily.     No current facility-administered medications on file prior to visit.         ROS:  ROS negative except as listed in the HPI.    Physical Exam:   72 y.o. female  Body mass index is 43.26 kg/m²., 122 kg (268 lb)  There were no vitals filed for this visit.  General: Alert, cooperative, appears well and in no observable distress. Appears stated age and BMI as listed above.  HEENT: Normocephalic, atraumatic on external visual inspection.  CV: No significant peripheral edema.  Respiratory: Normal respiratory effort.  Skin: Warm & well perfused; appropriate skin turgor.  Psych: Appropriate mood & affect.  Neuro: Gross sensation and motor intact in affected extremity/extremities.  Vascular: Peripheral pulses palpable in affected extremity/extremities.     MSK Exam:  Physical exam of the right elbow reveals no obvious deformities no wounds.  No significant swelling.  She has full range of motion which is painless except for mild pain with supination of the elbow.  She has some point tenderness laterally over the radial head.     Physical exam of the wrist reveals some mild swelling over the volar aspect of the wrist.  No obvious deformity of the wrist.  Mild tenderness over the distal radius at the joint line.  She does lack some terminal motion with pain at endpoints, overall mild limitation with mild pain which has improved from prior examination.   She is able to extend her  thumb make a fist neurovascularly intact distally in the right upper extremity.  Good capillary refill and pulses.      Radiology:    The following X-rays were ordered/reviewed today to evaluate the patient's symptoms: 2 views of the right elbow with forearm and wrist included show the radial neck fracture which overall is in acceptable alignment and positioning.  It has settled a bit from the prior x-rays but the joint space is still well-maintained and aligned.  No obvious fracture in the wrist there are some changes in favor degenerative process, no obvious fracture.    Procedure:   N/A      Misc. Data/Labs: N/A    Assessment & Plan:    ICD-10-CM ICD-9-CM   1. Closed nondisplaced fracture of head of right radius with routine healing, subsequent encounter  S52.124D V54.12   2. Wrist sprain, right, subsequent encounter  S63.501D V58.89     842.00   3. Pain  R52 780.96     No orders of the defined types were placed in this encounter.    Orders Placed This Encounter   Procedures   • XR Forearm 2 View Right     Is a 72-year-old female with an injury of her right upper extremity about a month ago.  She has stable radial head neck fracture which appears to be healing well.  Her wrist continues to improve and although there was some concern for an occult fracture I think the fact she is continue to improve is reassuring this was just a sprain.  I reviewed her imaging with Dr. Joseph who agreed that we may continue to treat this conservatively.  I would keep her in the wrist brace for another 2 weeks and then let her slowly start to wean out of it and increase her weightbearing at that time.  She may continue with gentle elbow range of motion gradually increasing weightbearing slowly.  We will see her back in 4 weeks with new x-rays I would anticipate this will be our final visit as long as she continues to improve.    Return in about 4 weeks (around 7/12/2023) for Recheck. If symptoms change call for sooner  appt..    Patient encouraged to call with questions or concerns prior to follow up.  Recommend ICE and/or HEAT PRN as discussed.  Will discuss with attending physician as needed.  Consider additional referrals, work up and/or advanced imaging as indicated or if patient fails to respond to conservative care.        Steve Estrada, DAQUAN      Dictated Utilizing Dragon Dictation

## 2023-08-10 DIAGNOSIS — M10.9 ACUTE GOUT OF RIGHT FOOT, UNSPECIFIED CAUSE: ICD-10-CM

## 2023-08-10 RX ORDER — ALLOPURINOL 300 MG/1
TABLET ORAL
Qty: 90 TABLET | Refills: 3 | Status: SHIPPED | OUTPATIENT
Start: 2023-08-10

## 2023-09-22 ENCOUNTER — TELEPHONE (OUTPATIENT)
Dept: ORTHOPEDIC SURGERY | Facility: CLINIC | Age: 73
End: 2023-09-22
Payer: MEDICARE

## 2023-09-22 DIAGNOSIS — M25.562 ACUTE PAIN OF LEFT KNEE: Primary | ICD-10-CM

## 2023-09-22 DIAGNOSIS — R52 PAIN: ICD-10-CM

## 2023-09-22 NOTE — TELEPHONE ENCOUNTER
Caller: Lynnette Wright    Relationship: Self    Best call back number: [phone]    Who is your current provider: JOE MARLOW    Who would you like your new provider to be: DR. VASQUEZ    What are your reasons for transferring care: PATIENT STATES SHE HAS A FRIEND WHO IS A PATIENT OF DR. VASQUEZ'S WHO SPEAKS VERY HIGHLY OF HIM.    Additional notes: NEW PROBLEM, LEFT KNEE PAIN. NO IMAGING OR PREVIOUS SURGERY.

## 2023-10-04 ENCOUNTER — TELEPHONE (OUTPATIENT)
Dept: ORTHOPEDIC SURGERY | Facility: CLINIC | Age: 73
End: 2023-10-04

## 2023-10-04 NOTE — TELEPHONE ENCOUNTER
Caller: Lynnette Wright    Relationship to patient: Self    Best call back number:     Chief complaint: LEFT KNEE    Type of visit: NEW PROBLEM    Requested date: ASAP    Additional notes:PT IS ESTABLISHED WITH JOE MARLOW BUT WOULD LIKE TO SEE DR VASQUEZ FOR HER LEFT KNEE PAIN

## 2023-10-25 ENCOUNTER — OFFICE VISIT (OUTPATIENT)
Dept: ORTHOPEDIC SURGERY | Facility: CLINIC | Age: 73
End: 2023-10-25
Payer: MEDICARE

## 2023-10-25 VITALS — TEMPERATURE: 98 F | HEIGHT: 65 IN | BODY MASS INDEX: 44.27 KG/M2 | WEIGHT: 265.7 LBS

## 2023-10-25 DIAGNOSIS — E66.01 OBESITY, MORBID, BMI 40.0-49.9: ICD-10-CM

## 2023-10-25 DIAGNOSIS — R52 PAIN: Primary | ICD-10-CM

## 2023-10-25 DIAGNOSIS — M17.12 ARTHRITIS OF LEFT KNEE: ICD-10-CM

## 2023-10-25 RX ORDER — LIDOCAINE HYDROCHLORIDE 10 MG/ML
2 INJECTION, SOLUTION EPIDURAL; INFILTRATION; INTRACAUDAL; PERINEURAL
Status: COMPLETED | OUTPATIENT
Start: 2023-10-25 | End: 2023-10-25

## 2023-10-25 RX ORDER — METHYLPREDNISOLONE ACETATE 80 MG/ML
80 INJECTION, SUSPENSION INTRA-ARTICULAR; INTRALESIONAL; INTRAMUSCULAR; SOFT TISSUE
Status: COMPLETED | OUTPATIENT
Start: 2023-10-25 | End: 2023-10-25

## 2023-10-25 RX ADMIN — LIDOCAINE HYDROCHLORIDE 2 ML: 10 INJECTION, SOLUTION EPIDURAL; INFILTRATION; INTRACAUDAL; PERINEURAL at 14:26

## 2023-10-25 RX ADMIN — METHYLPREDNISOLONE ACETATE 80 MG: 80 INJECTION, SUSPENSION INTRA-ARTICULAR; INTRALESIONAL; INTRAMUSCULAR; SOFT TISSUE at 14:26

## 2023-10-25 NOTE — PROGRESS NOTES
Patient Name: Lynnette Wright   YOB: 1950  Referring Primary Care Physician: Jacob Marroquin MD  BMI: Body mass index is 44.21 kg/m².    Chief Complaint:    Chief Complaint   Patient presents with    Left Knee - Initial Evaluation        HPI:     Lynnette Wright is a 72 y.o. female who presents today for evaluation of   Chief Complaint   Patient presents with    Left Knee - Initial Evaluation   .  Lynnette is seen today complaining of some left knee pain.  Is been hurting her for a while.  He says occasionally it will get a little better and then it bothers her.  She has been bringing Anita Bagley to see us.  She is Anant Wright mom.  She really has not tried any treatments for it today.  Him to bother her more more.      Subjective   Medications:   Home Medications:  Current Outpatient Medications on File Prior to Visit   Medication Sig    allopurinol (ZYLOPRIM) 300 MG tablet TAKE 1 TABLET EVERY DAY    amLODIPine (NORVASC) 10 MG tablet TAKE 1 TABLET DAILY    aspirin 325 MG tablet Take 1 tablet by mouth Daily.    atorvastatin (LIPITOR) 40 MG tablet TAKE 1 TABLET EVERY DAY    benazepril (LOTENSIN) 40 MG tablet TAKE 1 TABLET EVERY DAY    colchicine 0.6 MG tablet TAKE 2 TABLETS BY MOUTH THEN TAKE 1 TABLET BY MOUTH 1 HOUR LATER, THEN 1 TABLET EVERY DAY UNTIL SYMPTOMS RESOLVE    empagliflozin (JARDIANCE) 10 MG tablet tablet Take 1 tablet by mouth Daily.    folic acid (FOLVITE) 1 MG tablet TAKE 1 TABLET BY MOUTH DAILY    hydroCHLOROthiazide (HYDRODIURIL) 25 MG tablet TAKE 1 TABLET EVERY DAY    Insulin Aspart (NOVOLOG FLEXPEN SC) Inject  under the skin into the appropriate area as directed. 15 units in the am and 16u in the PM    insulin glargine (LANTUS, SEMGLEE) 100 UNIT/ML injection Inject  under the skin into the appropriate area as directed Daily. 20 units in the am and 56 units in the pm    Insulin Pen Needle 31G X 5 MM misc To use with Lantus Solorstar once daily    Lancets 30G misc     levothyroxine  (SYNTHROID, LEVOTHROID) 200 MCG tablet     Liraglutide (VICTOZA) 18 MG/3ML solution pen-injector injection Inject 1.2 mg under the skin into the appropriate area as directed Daily.    metFORMIN (GLUCOPHAGE) 500 MG tablet Take 1 tablet by mouth 2 (Two) Times a Day With Meals.    metoprolol succinate XL (TOPROL-XL) 50 MG 24 hr tablet TAKE 1 TABLET BY MOUTH DAILY    NovoLOG FlexPen 100 UNIT/ML solution pen-injector sc pen     TURMERIC PO Take  by mouth.    valsartan (DIOVAN) 320 MG tablet Take 1 tablet by mouth Daily.    vitamin B-12 (CYANOCOBALAMIN) 1000 MCG tablet Take 1 tablet by mouth Daily.     No current facility-administered medications on file prior to visit.     Current Medications:  Scheduled Meds:  Continuous Infusions:No current facility-administered medications for this visit.    PRN Meds:.    I have reviewed the patient's medical history in detail and updated the computerized patient record.  Review and summarization of old records includes:    Past Medical History:   Diagnosis Date    Diverticulosis     Fatty liver     Gout     Heart murmur     Hemangioma     Hip arthrosis     Lipoma     LVH (left ventricular hypertrophy)     Obesity     ELLA (obstructive sleep apnea)         Past Surgical History:   Procedure Laterality Date    BREAST BIOPSY      BREAST LUMPECTOMY      BREAST SURGERY      CATARACT EXTRACTION      CHOLECYSTECTOMY      COLONOSCOPY  2013    COLONOSCOPY N/A 07/03/2019    Procedure: COLONOSCOPY to cecum with biopsy / polypectomy;  Surgeon: Royal Zuleta MD;  Location: [facility];  Service: General    COLONOSCOPY  11/21/2022    HYSTERECTOMY  1998    FOR BLEEDING    THYROID SURGERY      nodule removed left thyroid    THYROIDECTOMY  1998    total thyroidectomy        Social History     Occupational History    Not on file   Tobacco Use    Smoking status: Never    Smokeless tobacco: Never   Vaping Use    Vaping Use: Never used   Substance and Sexual Activity    Alcohol use: Yes      "Comment: twice a month    Drug use: No    Sexual activity: Defer      Social History     Social History Narrative    Not on file        Family History   Problem Relation Age of Onset    Hypertension Mother     Other Mother         fall    Hypertension Father     Bone cancer Father     Lung cancer Father     Diabetes Brother     Cancer Brother         sinus    Hypertension Maternal Grandmother     Stroke Maternal Grandmother     Dementia Paternal Grandmother     Alzheimer's disease Paternal Grandmother     Stroke Paternal Grandfather        ROS: 14 point review of systems was performed and all other systems were reviewed and are negative except for documented findings in HPI and today's encounter.     Allergies:   Allergies   Allergen Reactions    Contrast Dye (Echo Or Unknown Ct/Mr) Hives     Constitutional:  Denies fever, shaking or chills   Eyes:  Denies change in visual acuity   HENT:  Denies nasal congestion or sore throat   Respiratory:  Denies cough or shortness of breath   Cardiovascular:  Denies chest pain or severe LE edema   GI:  Denies abdominal pain, nausea, vomiting, bloody stools or diarrhea   Musculoskeletal:  Numbness, tingling, pain, or loss of motor function only as noted above in history of present illness.  : Denies painful urination or hematuria  Integument:  Denies rash, lesion or ulceration   Neurologic:  Denies headache or focal weakness  Endocrine:  Denies lymphadenopathy  Psych:  Denies confusion or change in mental status   Hem:  Denies active bleeding    OBJECTIVE:  Physical Exam: 72 y.o. female  Wt Readings from Last 3 Encounters:   10/25/23 121 kg (265 lb 11.2 oz)   06/26/23 120 kg (264 lb 6.4 oz)   06/14/23 122 kg (268 lb)     Ht Readings from Last 1 Encounters:   10/25/23 165.1 cm (65\")     Body mass index is 44.21 kg/m².  Vitals:    10/25/23 1404   Temp: 98 °F (36.7 °C)     Vital signs reviewed.     General Appearance:    Alert, cooperative, in no acute distress                "   Eyes: conjunctiva clear  ENT: external ears and nose atraumatic  CV: no peripheral edema  Resp: normal respiratory effort  Skin: no rashes or wounds; normal turgor  Psych: mood and affect appropriate  Lymph: no nodes appreciated  Neuro: gross sensation intact  Vascular:  Palpable peripheral pulse in noted extremity  Musculoskeletal Extremities: Current BMI is 44.21 she has crepitation synovitis swelling and joint line tenderness in her knee and perhaps a Baker's cyst hips noncontributory to her current pain on exam today she is a little stiff when she first gets up but then she can walk it off    Radiology:   P lateral 40 degree PA x-ray left knee taken the office today for complaints of pain comparison views available shows advanced arthritic change.        Assessment:     ICD-10-CM ICD-9-CM   1. Pain  R52 780.96   2. Arthritis of left knee  M17.12 716.96   3. Obesity, morbid, BMI 40.0-49.9  E66.01 278.01        MDM/Plan:   The diagnosis(es), natural history, pathophysiology and treatment for diagnosis(es) were discussed. Opportunity given and questions answered.  Biomechanics of pertinent body areas discussed.  When appropriate, the use of ambulatory aids discussed.    Biomechanics of pertinent body areas discussed.  When appropriate, the use of ambulatory aids discussed.  BMI:  The concept of BMI body mass index and its importance and implications discussed.    EXERCISES:  Advice on benefits of, and types of regular/moderate exercise pertaining to orthopedic diagnosis(es).  MEDICATIONS:  The risks, benefits, warnings,side effects and alternatives of medications discussed.  Inflammation/pain control; with cold, heat, elevation and/or liniments discussed as appropriate  Cortisone Injection. See procedure note.  PT referral.  MEDICAL RECORDS reviewed from other provider(s) for past and current medical history pertinent to this complaint.      10/25/2023    Dictated utilizing Dragon dictation        Large Joint  Arthrocentesis: L knee  Date/Time: 10/25/2023 2:26 PM  Consent given by: patient  Site marked: site marked  Timeout: Immediately prior to procedure a time out was called to verify the correct patient, procedure, equipment, support staff and site/side marked as required   Supporting Documentation  Indications: pain   Procedure Details  Location: knee - L knee  Preparation: Patient was prepped and draped in the usual sterile fashion  Needle gauge: 21g.  Approach: anterolateral  Medications administered: 80 mg methylPREDNISolone acetate 80 MG/ML; 2 mL lidocaine PF 1% 1 %  Patient tolerance: patient tolerated the procedure well with no immediate complications

## 2023-11-06 DIAGNOSIS — D64.9 ANEMIA, UNSPECIFIED TYPE: ICD-10-CM

## 2023-11-06 DIAGNOSIS — I10 ESSENTIAL HYPERTENSION: ICD-10-CM

## 2023-11-06 RX ORDER — METOPROLOL SUCCINATE 50 MG/1
50 TABLET, EXTENDED RELEASE ORAL DAILY
Qty: 90 TABLET | Refills: 1 | Status: SHIPPED | OUTPATIENT
Start: 2023-11-06

## 2023-11-06 RX ORDER — FOLIC ACID 1 MG/1
1 TABLET ORAL DAILY
Qty: 90 TABLET | Refills: 1 | Status: SHIPPED | OUTPATIENT
Start: 2023-11-06

## 2023-11-08 ENCOUNTER — APPOINTMENT (OUTPATIENT)
Dept: WOMENS IMAGING | Facility: HOSPITAL | Age: 73
End: 2023-11-08
Payer: MEDICARE

## 2023-11-08 PROCEDURE — 77063 BREAST TOMOSYNTHESIS BI: CPT | Performed by: RADIOLOGY

## 2023-11-08 PROCEDURE — 77067 SCR MAMMO BI INCL CAD: CPT | Performed by: RADIOLOGY

## 2023-12-04 ENCOUNTER — TREATMENT (OUTPATIENT)
Dept: PHYSICAL THERAPY | Facility: CLINIC | Age: 73
End: 2023-12-04
Payer: MEDICARE

## 2023-12-04 DIAGNOSIS — M25.562 CHRONIC PAIN OF LEFT KNEE: ICD-10-CM

## 2023-12-04 DIAGNOSIS — R29.898 WEAKNESS OF BOTH LEGS: ICD-10-CM

## 2023-12-04 DIAGNOSIS — R26.2 DIFFICULTY WALKING DUE TO KNEE JOINT: ICD-10-CM

## 2023-12-04 DIAGNOSIS — G89.29 CHRONIC PAIN OF LEFT KNEE: ICD-10-CM

## 2023-12-04 DIAGNOSIS — M17.12 PRIMARY OSTEOARTHRITIS OF LEFT KNEE: Primary | ICD-10-CM

## 2023-12-04 NOTE — PROGRESS NOTES
Physical Therapy Initial Evaluation and Plan of Care  [facility] [place]   [address], [address]  [place] [address]  P: [phone]  F: [phone]    Patient: Lynnette Wright   : 1950  Diagnosis/ICD-10 Code:  Primary osteoarthritis of left knee [M17.12]  Referring practitioner: Gabe Arguelles MD  Date of Initial Visit: 2023  Today's Date: 2023  Patient seen for 1 session         Visit Diagnoses:    ICD-10-CM ICD-9-CM   1. Primary osteoarthritis of left knee  M17.12 715.16   2. Chronic pain of left knee  M25.562 719.46    G89.29 338.29   3. Difficulty walking due to knee joint  R26.2 719.7   4. Weakness of both legs  R29.898 729.89       PMHx Reviewed : 2023      Subjective Evaluation    History of Present Illness  Mechanism of injury: Pt presents to PT w/ a hx of (L) knee pain from OA.  She recently saw Dr. Arguelles who gave cortisone injection in the (L) knee with some relief discomfort.  He also referred to PT.      She reports that main complaint is the pain & discomfort in the back of the (L) knee.      Dr. Arguelles spoke to the pt about losing weight and she has already lost 6 lbs since her visit with Blane on 10/25/23.  She reports that she is try and is motivated.        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      Quality of life: good    Pain  Current pain rating: 3  At best pain ratin  At worst pain ratin  Location: (L) Knee (Mainly posterior (L) knee)  Quality: dull ache, throbbing and knife-like  Relieving factors: change in position, medications and rest  Aggravating factors: ambulation, standing and stairs (initial WB)    Social Support  Lives in: multiple-level home ( does laundry in Basement)  Lives with: spouse    Treatments  Previous treatment: injection treatment           Objective          Active Range of Motion   Left Knee   Flexion:  109 degrees   Extensor la degrees     Right Knee   Flexion: 130 degrees   Extension: 0 degrees     Strength/Myotome Testing     Left Hip   Planes of Motion   Flexion: 4  External rotation: 4  Internal rotation: 4    Right Hip   Planes of Motion   Flexion: 4+  External rotation: 5  Internal rotation: 4+    Left Knee   Flexion: 4  Extension: 4+    Right Knee   Flexion: 4  Extension: 5    Left Ankle/Foot   Dorsiflexion: 5  Eversion: 5    Right Ankle/Foot   Dorsiflexion: 5  Eversion: 5          Assessment & Plan       Assessment  Impairments: abnormal or restricted ROM, activity intolerance, impaired balance, impaired physical strength, lacks appropriate home exercise program, pain with function, safety issue and weight-bearing intolerance   Functional limitations: carrying objects, walking, sitting, standing, stooping and unable to perform repetitive tasks   Assessment details: Pt presents to PT with symptoms consistent with L knee OA, pain and weakness  Pt would benefit from skilled PT intervention to address the deficits noted.   Prognosis: good  Prognosis details:         Goals  Plan Goals: SHORT TERM GOALS: Time for Goal Achievement: 4 weeks    1.  Patient to be compliant with HEP.   2.  Pt able to ascend/descend steps and transfer with less knee pain < 5/10  3.  Increased hip joint mobility to allow for decreased stress at tibiofemoral joint  4.  Pt. to exhibit increased LE endurance/strength to 4+/5 to allow for increased ease with sit-stand and standing/walking > 30 minutes, such as walking in airports.    LONG TERM GOALS: Time for Goal Achievement: 8 weeks  1.  Pt to score < 15% perceived disability on WOMAC  2.  Patient able to ascend/descend steps and prolonged standing & cooking with pain < 2/10  3.  Pt to exhibit full knee AROM to allow for kneeling, bending squatting as is necessary for ADL's, IADL's and household activities.   4.  Pt to exhibit LE endurance/strength to 5/5 to allow for walking, steps  and transfers to occur safely  5.  Pt to demonstrate increased stability of the knee to balance on foam as needed to traverse uneven terrain .          Plan  Therapy options: will be seen for skilled therapy services  Planned modality interventions: ultrasound, electrical stimulation/Russian stimulation, thermotherapy (hydrocollator packs) and cryotherapy  Other planned modality interventions: Dry Needling  Planned therapy interventions: balance/weight-bearing training, body mechanics training, functional ROM exercises, flexibility, home exercise program, joint mobilization, stretching, strengthening, soft tissue mobilization, neuromuscular re-education, manual therapy and therapeutic activities  Frequency: 2x week  Duration in weeks: 8  Treatment plan discussed with: patient  Plan details: The pt is moving to [place] for 3 months on December 26.  Planning to use her pool and HEP while there.            Manual Therapy:          mins  53490;  Therapeutic Exercise:     12     mins  55351;     Neuromuscular Oswaldo:           mins  02115;    Therapeutic Activity:      13     mins  70168;     Gait Training:            mins  29315;     Ultrasound:           mins  22843;    Electrical Stimulation:          mins  61335 ( );  Dry Needling           mins self-pay  Traction           mins 46473  Canalith Repositioning         mins 37515      Timed Treatment:   25   mins   Total Treatment:     60   mins      PT: Chris Salas PT     License Number: [id number]  Electronically signed by Chris Salas PT, 12/04/23, 11:18 AM EST    Certification Period: 12/4/2023 thru 3/2/2024  I certify that the therapy services are furnished while this patient is under my care.  The services outlined above are required by this patient, and will be reviewed every 90 days.         Physician Signature:__________________________________________________    PHYSICIAN: Gabe Arguelles MD  NPI: [id number]                                       DATE:      Please sign in Epic or return via fax to [email or web address] . Thank you, [facility].

## 2023-12-08 ENCOUNTER — TREATMENT (OUTPATIENT)
Dept: PHYSICAL THERAPY | Facility: CLINIC | Age: 73
End: 2023-12-08
Payer: MEDICARE

## 2023-12-08 DIAGNOSIS — R29.898 WEAKNESS OF BOTH LEGS: ICD-10-CM

## 2023-12-08 DIAGNOSIS — M17.12 PRIMARY OSTEOARTHRITIS OF LEFT KNEE: Primary | ICD-10-CM

## 2023-12-08 DIAGNOSIS — R26.2 DIFFICULTY WALKING DUE TO KNEE JOINT: ICD-10-CM

## 2023-12-08 DIAGNOSIS — G89.29 CHRONIC PAIN OF LEFT KNEE: ICD-10-CM

## 2023-12-08 DIAGNOSIS — M25.562 CHRONIC PAIN OF LEFT KNEE: ICD-10-CM

## 2023-12-08 NOTE — PROGRESS NOTES
Physical Therapy Daily Treatment Note  [facility] [place]   [address], [address]  [place] [address]  P: [phone]  F: [phone]    Patient: Lynnette Wright   : 1950  Referring practitioner: Gabe Arguelles MD  Date of Initial Visit: Type: THERAPY  Noted: 2023  Today's Date: 2023  Patient seen for 2 sessions       Visit Diagnoses:    ICD-10-CM ICD-9-CM   1. Primary osteoarthritis of left knee  M17.12 715.16   2. Chronic pain of left knee  M25.562 719.46    G89.29 338.29   3. Difficulty walking due to knee joint  R26.2 719.7   4. Weakness of both legs  R29.898 729.89         Subjective:  Lynnette Wright reports: I am doing ok.  I had some soreness with the HEP, but went away with ice and rest.        Objective   See Exercise, Manual, and Modality Logs for complete treatment.       Assessment:  Reviewed the HEP and corrected some mechanics for correct performance.  Progressed exercise with good tolerance to the (L) knee.  Reviewed using her recumbent stepper at home and UBE.        Plan:   Progress ROM / strengthening / stabilization / functional activity as tolerated          Timed:         Manual Therapy:         mins  53379;     Therapeutic Exercise:    20     mins  53266;     Neuromuscular Oswaldo:        mins  14705;    Therapeutic Activity:     10     mins  64995;     Gait Training:           mins  21353;     Ultrasound:          mins  85192;    Ionto                                   mins  54237  Self Care                            mins  44539  Traction          mins 87924      Un-Timed:  Canalith Repos         mins 88647  Electrical Stimulation:         mins  63662 ( );  Dry Needling          mins self-pay  Traction          mins 23872        Timed Treatment:   30   mins   Total Treatment:     30   mins    Chris Salas PT  [place] License #: [id number]    Physical Therapist

## 2023-12-12 ENCOUNTER — TREATMENT (OUTPATIENT)
Dept: PHYSICAL THERAPY | Facility: CLINIC | Age: 73
End: 2023-12-12
Payer: MEDICARE

## 2023-12-12 DIAGNOSIS — M25.562 CHRONIC PAIN OF LEFT KNEE: ICD-10-CM

## 2023-12-12 DIAGNOSIS — G89.29 CHRONIC PAIN OF LEFT KNEE: ICD-10-CM

## 2023-12-12 DIAGNOSIS — R26.2 DIFFICULTY WALKING DUE TO KNEE JOINT: ICD-10-CM

## 2023-12-12 DIAGNOSIS — I10 ESSENTIAL HYPERTENSION: Primary | ICD-10-CM

## 2023-12-12 DIAGNOSIS — R29.898 WEAKNESS OF BOTH LEGS: ICD-10-CM

## 2023-12-12 DIAGNOSIS — M17.12 PRIMARY OSTEOARTHRITIS OF LEFT KNEE: Primary | ICD-10-CM

## 2023-12-12 NOTE — PROGRESS NOTES
Physical Therapy Daily Treatment Note  [facility] [place]   [address], [address]  [place] [address]  P: [phone]       F: [phone]    Patient: Lynnette Wright   : 1950  Diagnosis/ICD-10 Code:  Primary osteoarthritis of left knee [M17.12]  Referring practitioner: Gabe Arguelles MD  Date of Initial Visit: Type: THERAPY  Noted: 2023  Today's Date: 2023  Patient seen for 3 sessions       Lynnette Wright reports: doing well today, L knee is sore but its the end of the day. Typically sore at this time. No increased soreness since last session.     Subjective     Objective   See Exercise, Manual, and Modality Logs for complete treatment.       Assessment/Plan  Subjectively, pt reports no increase of pain or discomfort with interventions performed today. Performed well with continued knee strengthening and stability interventions. Continues to demonstrate good tolerance to exercise regimen in clinic and HEP. Continues to benefit from verbal/tactile cues to ensure proper form and technique for exercise performance.     Progress per Plan of Care           Manual Therapy:         mins  23195;  Therapeutic Exercise:    20     mins  41535;     Neuromuscular Oswaldo:        mins  05830;    Therapeutic Activity:     10     mins  53405;     Gait Training:           mins  90913;     Ultrasound:          mins  83167;    Electrical Stimulation:         mins  86071;  Traction          mins 19721    Timed Treatment:   30   mins   Total Treatment:     30   mins    Karla Quesada PTA  Physical Therapist Assistant [id number]

## 2023-12-14 ENCOUNTER — OFFICE VISIT (OUTPATIENT)
Dept: INTERNAL MEDICINE | Facility: CLINIC | Age: 73
End: 2023-12-14
Payer: MEDICARE

## 2023-12-14 VITALS
SYSTOLIC BLOOD PRESSURE: 112 MMHG | HEIGHT: 65 IN | WEIGHT: 267.2 LBS | BODY MASS INDEX: 44.52 KG/M2 | DIASTOLIC BLOOD PRESSURE: 80 MMHG

## 2023-12-14 DIAGNOSIS — I10 ESSENTIAL HYPERTENSION: Primary | Chronic | ICD-10-CM

## 2023-12-14 DIAGNOSIS — E11.65 TYPE 2 DIABETES MELLITUS WITH HYPERGLYCEMIA, WITH LONG-TERM CURRENT USE OF INSULIN: ICD-10-CM

## 2023-12-14 DIAGNOSIS — Z79.4 TYPE 2 DIABETES MELLITUS WITH HYPERGLYCEMIA, WITH LONG-TERM CURRENT USE OF INSULIN: ICD-10-CM

## 2023-12-14 DIAGNOSIS — E03.8 OTHER SPECIFIED HYPOTHYROIDISM: ICD-10-CM

## 2023-12-14 DIAGNOSIS — E78.49 OTHER HYPERLIPIDEMIA: Chronic | ICD-10-CM

## 2023-12-14 PROCEDURE — 3079F DIAST BP 80-89 MM HG: CPT | Performed by: INTERNAL MEDICINE

## 2023-12-14 PROCEDURE — 1160F RVW MEDS BY RX/DR IN RCRD: CPT | Performed by: INTERNAL MEDICINE

## 2023-12-14 PROCEDURE — 3051F HG A1C>EQUAL 7.0%<8.0%: CPT | Performed by: INTERNAL MEDICINE

## 2023-12-14 PROCEDURE — 3074F SYST BP LT 130 MM HG: CPT | Performed by: INTERNAL MEDICINE

## 2023-12-14 PROCEDURE — 99214 OFFICE O/P EST MOD 30 MIN: CPT | Performed by: INTERNAL MEDICINE

## 2023-12-14 PROCEDURE — 1159F MED LIST DOCD IN RCRD: CPT | Performed by: INTERNAL MEDICINE

## 2023-12-14 RX ORDER — AMLODIPINE BESYLATE 10 MG/1
10 TABLET ORAL DAILY
Qty: 90 TABLET | Refills: 3 | Status: SHIPPED | OUTPATIENT
Start: 2023-12-14

## 2023-12-14 NOTE — PROGRESS NOTES
Subjective        Chief Complaint   Patient presents with    Hypertension           Lynnette Wright is a 73 y.o. female who presents for    Patient Active Problem List   Diagnosis    Other specified hypothyroidism    Anemia    B12 deficiency    Essential hypertension    Other hyperlipidemia    Tubular adenoma    Morbidly obese       History of Present Illness      She saw Dr. Arguelles for left knee pain. She had a steroid injection and PT. Her last A1c was 7.6 in Sept. She has not been checking her BP. She denies chest pain or dyspnea.    Allergies   Allergen Reactions    Contrast Dye (Echo Or Unknown Ct/Mr) Hives       Current Outpatient Medications on File Prior to Visit   Medication Sig Dispense Refill    allopurinol (ZYLOPRIM) 300 MG tablet TAKE 1 TABLET EVERY DAY 90 tablet 3    aspirin 325 MG tablet Take 1 tablet by mouth Daily.      atorvastatin (LIPITOR) 40 MG tablet TAKE 1 TABLET EVERY DAY 90 tablet 3    benazepril (LOTENSIN) 40 MG tablet TAKE 1 TABLET EVERY DAY 90 tablet 3    colchicine 0.6 MG tablet TAKE 2 TABLETS BY MOUTH THEN TAKE 1 TABLET BY MOUTH 1 HOUR LATER, THEN 1 TABLET EVERY DAY UNTIL SYMPTOMS RESOLVE 20 tablet 1    empagliflozin (JARDIANCE) 10 MG tablet tablet Take 1 tablet by mouth Daily. 30 tablet 11    folic acid (FOLVITE) 1 MG tablet TAKE 1 TABLET BY MOUTH DAILY 90 tablet 1    hydroCHLOROthiazide (HYDRODIURIL) 25 MG tablet TAKE 1 TABLET EVERY DAY 90 tablet 1    Insulin Aspart (NOVOLOG FLEXPEN SC) Inject  under the skin into the appropriate area as directed. 15 units in the am and 16u in the PM      insulin glargine (LANTUS, SEMGLEE) 100 UNIT/ML injection Inject  under the skin into the appropriate area as directed Daily. 20 units in the am and 56 units in the pm      Insulin Pen Needle 31G X 5 MM misc To use with Lantus Solorstar once daily 100 each 3    Lancets 30G misc       levothyroxine (SYNTHROID, LEVOTHROID) 200 MCG tablet       Liraglutide (VICTOZA) 18 MG/3ML solution pen-injector injection  Inject 1.2 mg under the skin into the appropriate area as directed Daily.      metFORMIN (GLUCOPHAGE) 500 MG tablet Take 1 tablet by mouth 2 (Two) Times a Day With Meals.      metoprolol succinate XL (TOPROL-XL) 50 MG 24 hr tablet TAKE 1 TABLET BY MOUTH DAILY 90 tablet 1    NovoLOG FlexPen 100 UNIT/ML solution pen-injector sc pen       TURMERIC PO Take  by mouth.      valsartan (DIOVAN) 320 MG tablet Take 1 tablet by mouth Daily.      vitamin B-12 (CYANOCOBALAMIN) 1000 MCG tablet Take 1 tablet by mouth Daily.      [DISCONTINUED] amLODIPine (NORVASC) 10 MG tablet TAKE 1 TABLET DAILY 90 tablet 1     No current facility-administered medications on file prior to visit.       Past Medical History:   Diagnosis Date    Diverticulosis     Fatty liver     Gout     Heart murmur     Hemangioma     LVH (left ventricular hypertrophy)     ELLA (obstructive sleep apnea)        Past Surgical History:   Procedure Laterality Date    BREAST BIOPSY      BREAST LUMPECTOMY      BREAST SURGERY      CATARACT EXTRACTION      CHOLECYSTECTOMY      COLONOSCOPY  2013    COLONOSCOPY N/A 07/03/2019    Procedure: COLONOSCOPY to cecum with biopsy / polypectomy;  Surgeon: Royal Zuleta MD;  Location: [facility] ENDOSCOPY;  Service: General    COLONOSCOPY  11/21/2022    HYSTERECTOMY  1998    FOR BLEEDING    THYROID SURGERY      nodule removed left thyroid    THYROIDECTOMY  1998    total thyroidectomy       Family History   Problem Relation Age of Onset    Hypertension Mother     Other Mother         fall    Hypertension Father     Bone cancer Father     Lung cancer Father     Diabetes Brother     Cancer Brother         sinus    Hypertension Maternal Grandmother     Stroke Maternal Grandmother     Dementia Paternal Grandmother     Alzheimer's disease Paternal Grandmother     Stroke Paternal Grandfather        Social History     Socioeconomic History    Marital status:    Tobacco Use    Smoking status: Never    Smokeless tobacco: Never   Vaping  "Use    Vaping Use: Never used   Substance and Sexual Activity    Alcohol use: Yes     Comment: twice a month    Drug use: No    Sexual activity: Defer           The following portions of the patient's history were reviewed and updated as appropriate: problem list, allergies, current medications, past medical history, past family history, past social history, and past surgical history.    Review of Systems    Immunization History   Administered Date(s) Administered    COVID-19 (MODERNA) 1st,2nd,3rd Dose Monovalent 01/22/2021, 02/19/2021, 09/04/2021    Fluad Quad 65+ 11/06/2023    Fluzone (or Fluarix & Flulaval for VFC) >6mos 10/22/2022    Fluzone High Dose =>65 Years ([employer] ONLY) 10/25/2018, 10/03/2019, 10/12/2020    Fluzone High-Dose 65+yrs 10/16/2021, 10/22/2022    Hepatitis A 12/12/2017, 06/11/2018    Hepatitis B 10/07/2019    Hepatitis B Adult/Adolescent IM 12/12/2017, 06/11/2018    Pneumococcal Conjugate 13-Valent (PCV13) 05/18/2015    Pneumococcal Polysaccharide (PPSV23) 01/01/2011, 12/03/2020    Shingrix 01/20/2023, 11/06/2023    Tdap 01/01/2013    Zostavax 01/01/2012       Objective   Vitals:    12/14/23 1348   BP: 112/80   Weight: 121 kg (267 lb 3.2 oz)   Height: 165.1 cm (65\")     Body mass index is 44.46 kg/m².  Physical Exam  Vitals reviewed.   Constitutional:       Appearance: She is well-developed.   HENT:      Head: Normocephalic and atraumatic.   Cardiovascular:      Rate and Rhythm: Normal rate and regular rhythm.      Heart sounds: Normal heart sounds, S1 normal and S2 normal.   Pulmonary:      Effort: Pulmonary effort is normal.      Breath sounds: Normal breath sounds.   Skin:     General: Skin is warm.   Neurological:      Mental Status: She is alert.   Psychiatric:         Behavior: Behavior normal.         Procedures    Assessment & Plan   Diagnoses and all orders for this visit:    1. Essential hypertension (Primary)  -     amLODIPine (NORVASC) 10 MG tablet; Take 1 tablet by mouth Daily.  " Dispense: 90 tablet; Refill: 3  -     Basic Metabolic Panel; Future    2. Other specified hypothyroidism  -     TSH; Future    3. Type 2 diabetes mellitus with hyperglycemia, with long-term current use of insulin  Comments:  sees endo regularly    4. Other hyperlipidemia               Recc Tdap. Reviewed Cmp, flp, cbc and tsh. BP is good. Discussed COVID shots. Recc wt loss.    Return in about 6 months (around 6/14/2024) for Medicare Wellness, Lab Before FUP.

## 2023-12-15 ENCOUNTER — TREATMENT (OUTPATIENT)
Dept: PHYSICAL THERAPY | Facility: CLINIC | Age: 73
End: 2023-12-15
Payer: MEDICARE

## 2023-12-15 DIAGNOSIS — M25.562 CHRONIC PAIN OF LEFT KNEE: ICD-10-CM

## 2023-12-15 DIAGNOSIS — R26.2 DIFFICULTY WALKING DUE TO KNEE JOINT: ICD-10-CM

## 2023-12-15 DIAGNOSIS — M17.12 PRIMARY OSTEOARTHRITIS OF LEFT KNEE: Primary | ICD-10-CM

## 2023-12-15 DIAGNOSIS — R29.898 WEAKNESS OF BOTH LEGS: ICD-10-CM

## 2023-12-15 DIAGNOSIS — G89.29 CHRONIC PAIN OF LEFT KNEE: ICD-10-CM

## 2023-12-15 NOTE — PROGRESS NOTES
Physical Therapy Daily Treatment Note  [facility] [place]   [address], [address]  [place] [address]  P: [phone]  F: [phone]    Patient: Lynnette Wright   : 1950  Referring practitioner: Gabe Arguelles MD  Date of Initial Visit: Type: THERAPY  Noted: 2023  Today's Date: 12/15/2023  Patient seen for 4 sessions       Visit Diagnoses:    ICD-10-CM ICD-9-CM   1. Primary osteoarthritis of left knee  M17.12 715.16   2. Chronic pain of left knee  M25.562 719.46    G89.29 338.29   3. Difficulty walking due to knee joint  R26.2 719.7   4. Weakness of both legs  R29.898 729.89         Subjective:  Lynnette Wright reports: I am doing better since starting PT.  I am happy with the progress.  I am working on my HEP daily.        Objective   See Exercise, Manual, and Modality Logs for complete treatment.       Assessment:  The pt atul Tx well wit the current exercise.  Reviewed with the pt about diet and HEP while gone to [place].      Plan:   Re-assess for DC.          Timed:         Manual Therapy:         mins  25533;     Therapeutic Exercise:    20     mins  71039;     Neuromuscular Oswaldo:        mins  08780;    Therapeutic Activity:     10     mins  68333;     Gait Training:           mins  77280;     Ultrasound:          mins  11060;    Ionto                                   mins  27177  Self Care                            mins  22578  Traction          mins 61046      Un-Timed:  Canalith Repos         mins 42651  Electrical Stimulation:         mins  72546 ( );  Dry Needling          mins self-pay  Traction          mins 10451        Timed Treatment:   30   mins   Total Treatment:     30   mins    Chris Salas PT  [place] License #: [id number]    Physical Therapist

## 2023-12-18 ENCOUNTER — TREATMENT (OUTPATIENT)
Dept: PHYSICAL THERAPY | Facility: CLINIC | Age: 73
End: 2023-12-18
Payer: MEDICARE

## 2023-12-18 DIAGNOSIS — G89.29 CHRONIC PAIN OF LEFT KNEE: ICD-10-CM

## 2023-12-18 DIAGNOSIS — R29.898 WEAKNESS OF BOTH LEGS: ICD-10-CM

## 2023-12-18 DIAGNOSIS — M25.562 CHRONIC PAIN OF LEFT KNEE: ICD-10-CM

## 2023-12-18 DIAGNOSIS — R26.2 DIFFICULTY WALKING DUE TO KNEE JOINT: ICD-10-CM

## 2023-12-18 DIAGNOSIS — M17.12 PRIMARY OSTEOARTHRITIS OF LEFT KNEE: Primary | ICD-10-CM

## 2023-12-18 NOTE — PROGRESS NOTES
Physical Therapy Daily Treatment Note  [facility] [place]   [address], [address]  [place] [address]  P: [phone]       F: [phone]    Patient: Lynnette Wright   : 1950  Diagnosis/ICD-10 Code:  Primary osteoarthritis of left knee [M17.12]  Referring practitioner: Gabe Arguelles MD  Date of Initial Visit: Type: THERAPY  Noted: 2023  Today's Date: 2023  Patient seen for 5 sessions       Lynnette Wright reports: L knee is achy but I know that's not going away.     Subjective     Objective   See Exercise, Manual, and Modality Logs for complete treatment.       Assessment/Plan  Subjectively, pt reports no increase of pain or discomfort with interventions performed today. Performed well with added hip extension exercise for increased SL stability and glute strength. Continues to demonstrate good tolerance to exercises in clinic. Discussed different activities and exercises to do in the pool when she travels to [place] after [place]. Continues to benefit from verbal/tactile cues to ensure proper form and technique for exercise performance.     Progress per Plan of Care           Manual Therapy:         mins  13689;  Therapeutic Exercise:    15     mins  09055;     Neuromuscular Oswaldo:        mins  63078;    Therapeutic Activity:     15     mins  83323;     Gait Training:           mins  96253;     Ultrasound:          mins  60336;    Electrical Stimulation:         mins  42540;  Traction          mins 15262    Timed Treatment:   30   mins   Total Treatment:     30   mins    Karla Quesada PTA  Physical Therapist Assistant [id number]

## 2023-12-22 ENCOUNTER — TREATMENT (OUTPATIENT)
Dept: PHYSICAL THERAPY | Facility: CLINIC | Age: 73
End: 2023-12-22
Payer: MEDICARE

## 2023-12-22 DIAGNOSIS — M25.562 CHRONIC PAIN OF LEFT KNEE: ICD-10-CM

## 2023-12-22 DIAGNOSIS — M17.12 PRIMARY OSTEOARTHRITIS OF LEFT KNEE: Primary | ICD-10-CM

## 2023-12-22 DIAGNOSIS — G89.29 CHRONIC PAIN OF LEFT KNEE: ICD-10-CM

## 2023-12-22 DIAGNOSIS — R29.898 WEAKNESS OF BOTH LEGS: ICD-10-CM

## 2023-12-22 DIAGNOSIS — R26.2 DIFFICULTY WALKING DUE TO KNEE JOINT: ICD-10-CM

## 2023-12-22 NOTE — PROGRESS NOTES
Physical Therapy Daily Treatment Note  [facility] [place]   [address], [address]  [place] [address]  P: [phone]  F: [phone]    Patient: Lynnette Wright   : 1950  Referring practitioner: Gabe Arguelles MD  Date of Initial Visit: Type: THERAPY  Noted: 2023  Today's Date: 2023  Patient seen for 6 sessions       Visit Diagnoses:    ICD-10-CM ICD-9-CM   1. Primary osteoarthritis of left knee  M17.12 715.16   2. Chronic pain of left knee  M25.562 719.46    G89.29 338.29   3. Difficulty walking due to knee joint  R26.2 719.7   4. Weakness of both legs  R29.898 729.89         Subjective:  Lynnette Wright reports: I am doing better.  I have more flexibility of my L knee.  We leave for [place] on  and will be gone for a couple months.  There is a pool at my house in [place] so I will be exercising regularly while there.  I return to [facility] in April for review of my (L) knee and possible scheduling of the TKR.  I am going to continue to work on losing weight and gaining strength to prepare for the Sx.      (L) Knee Pain @ worst:  7/10 = too much on feet and steps      Objective          Active Range of Motion   Left Knee   Flexion: 120 degrees   Extensor la degrees     Right Knee   Flexion: 130 degrees   Extension: 0 degrees     Strength/Myotome Testing     Left Hip   Planes of Motion   Flexion: 4+  External rotation: 4+  Internal rotation: 4+    Right Hip   Planes of Motion   Flexion: 4+  External rotation: 5  Internal rotation: 5    Left Knee   Flexion: 5  Extension: 5    Right Knee   Flexion: 5  Extension: 5    Left Ankle/Foot   Dorsiflexion: 5  Eversion: 5    Right Ankle/Foot   Dorsiflexion: 5  Eversion: 5      See Exercise, Manual, and Modality Logs for complete treatment.       Assessment:  Lynnette has improved greatly with her (L) knee since starting PT.  She is still limited by her (L) knee OA, deconditioning and weight, but she has demonstrated  improvement with them all.  She is (I) w/ her HEP and will continue while living in [place].  We will discontinue PT a this time.        Plan:   DC to HEP.  Pt instructed to call with questions or issues related to her (L) knee.          Timed:         Manual Therapy:         mins  87051;     Therapeutic Exercise:    15     mins  34065;     Neuromuscular Oswaldo:        mins  66275;    Therapeutic Activity:     25     mins  08413;     Gait Training:           mins  86583;     Ultrasound:          mins  43725;    Ionto                                   mins  22541  Self Care                            mins  04339  Traction          mins 84936      Un-Timed:  Canalith Repos         mins 88349  Electrical Stimulation:         mins  35572 ( );  Dry Needling          mins self-pay  Traction          mins 62970        Timed Treatment:   40   mins   Total Treatment:     40   mins    SANTI Castano License #: [id number]    Physical Therapist

## 2024-01-04 DIAGNOSIS — I10 ESSENTIAL HYPERTENSION: ICD-10-CM

## 2024-01-04 DIAGNOSIS — D64.9 ANEMIA, UNSPECIFIED TYPE: ICD-10-CM

## 2024-01-04 RX ORDER — FOLIC ACID 1 MG/1
1 TABLET ORAL DAILY
Qty: 90 TABLET | Refills: 1 | Status: SHIPPED | OUTPATIENT
Start: 2024-01-04

## 2024-01-04 RX ORDER — METOPROLOL SUCCINATE 50 MG/1
50 TABLET, EXTENDED RELEASE ORAL DAILY
Qty: 90 TABLET | Refills: 1 | Status: SHIPPED | OUTPATIENT
Start: 2024-01-04

## 2024-03-02 DIAGNOSIS — I10 ESSENTIAL HYPERTENSION: ICD-10-CM

## 2024-03-04 RX ORDER — HYDROCHLOROTHIAZIDE 25 MG/1
TABLET ORAL
Qty: 90 TABLET | Refills: 3 | Status: SHIPPED | OUTPATIENT
Start: 2024-03-04

## 2024-04-18 ENCOUNTER — CLINICAL SUPPORT (OUTPATIENT)
Dept: ORTHOPEDIC SURGERY | Facility: CLINIC | Age: 74
End: 2024-04-18
Payer: MEDICARE

## 2024-04-18 VITALS — HEIGHT: 65 IN | TEMPERATURE: 96.6 F | WEIGHT: 263.8 LBS | BODY MASS INDEX: 43.95 KG/M2

## 2024-04-18 DIAGNOSIS — M17.12 ARTHRITIS OF LEFT KNEE: ICD-10-CM

## 2024-04-18 DIAGNOSIS — R52 PAIN: Primary | ICD-10-CM

## 2024-04-18 RX ORDER — METHYLPREDNISOLONE ACETATE 80 MG/ML
80 INJECTION, SUSPENSION INTRA-ARTICULAR; INTRALESIONAL; INTRAMUSCULAR; SOFT TISSUE
Status: COMPLETED | OUTPATIENT
Start: 2024-04-18 | End: 2024-04-18

## 2024-04-18 RX ORDER — LIDOCAINE HYDROCHLORIDE 10 MG/ML
2 INJECTION, SOLUTION EPIDURAL; INFILTRATION; INTRACAUDAL; PERINEURAL
Status: COMPLETED | OUTPATIENT
Start: 2024-04-18 | End: 2024-04-18

## 2024-04-18 RX ADMIN — METHYLPREDNISOLONE ACETATE 80 MG: 80 INJECTION, SUSPENSION INTRA-ARTICULAR; INTRALESIONAL; INTRAMUSCULAR; SOFT TISSUE at 10:36

## 2024-04-18 RX ADMIN — LIDOCAINE HYDROCHLORIDE 2 ML: 10 INJECTION, SOLUTION EPIDURAL; INFILTRATION; INTRACAUDAL; PERINEURAL at 10:36

## 2024-04-18 NOTE — PROGRESS NOTES
Lynnette Wright is here today for worsening Left knee pain. Knee injection was discussed with the patient in detail, including indication, risks, benefits, and alternatives. Verbal consent was given for the procedure. Injection site was aseptically prepared.      KNEE Injection Procedure Note:    Large Joint Arthrocentesis: L knee  Date/Time: 4/18/2024 10:36 AM  Consent given by: patient  Site marked: site marked  Timeout: Immediately prior to procedure a time out was called to verify the correct patient, procedure, equipment, support staff and site/side marked as required   Supporting Documentation  Indications: pain   Procedure Details  Location: knee - L knee  Preparation: Patient was prepped and draped in the usual sterile fashion  Needle gauge: 21G.  Medications administered: 80 mg methylPREDNISolone acetate 80 MG/ML; 2 mL lidocaine PF 1% 1 %  Patient tolerance: patient tolerated the procedure well with no immediate complications     Lynnette Wright tolerated the procedure well. A Bandage was applied to the injection site. At the conclusion of the injection I discussed the importance of continued quad strengthening exercises on a daily basis. I will see the patient back if the symptoms should fail to improve or worsen.    Karyn Chaves, DAQUAN  4/18/2024    Dictated Utilizing Dragon Dictation

## 2024-05-11 DIAGNOSIS — E78.5 HYPERLIPIDEMIA, UNSPECIFIED HYPERLIPIDEMIA TYPE: ICD-10-CM

## 2024-05-11 DIAGNOSIS — I10 ESSENTIAL HYPERTENSION: ICD-10-CM

## 2024-05-13 ENCOUNTER — TELEPHONE (OUTPATIENT)
Dept: INTERNAL MEDICINE | Facility: CLINIC | Age: 74
End: 2024-05-13
Payer: MEDICARE

## 2024-05-13 RX ORDER — ATORVASTATIN CALCIUM 40 MG/1
TABLET, FILM COATED ORAL
Qty: 90 TABLET | Refills: 3 | Status: SHIPPED | OUTPATIENT
Start: 2024-05-13

## 2024-05-13 RX ORDER — BENAZEPRIL HYDROCHLORIDE 40 MG/1
TABLET ORAL
Qty: 90 TABLET | Refills: 3 | Status: SHIPPED | OUTPATIENT
Start: 2024-05-13

## 2024-06-18 ENCOUNTER — OFFICE VISIT (OUTPATIENT)
Dept: INTERNAL MEDICINE | Facility: CLINIC | Age: 74
End: 2024-06-18
Payer: MEDICARE

## 2024-06-18 VITALS
DIASTOLIC BLOOD PRESSURE: 82 MMHG | BODY MASS INDEX: 43.85 KG/M2 | HEIGHT: 65 IN | WEIGHT: 263.2 LBS | SYSTOLIC BLOOD PRESSURE: 112 MMHG

## 2024-06-18 DIAGNOSIS — E03.8 OTHER SPECIFIED HYPOTHYROIDISM: ICD-10-CM

## 2024-06-18 DIAGNOSIS — E78.00 HIGH CHOLESTEROL: Chronic | ICD-10-CM

## 2024-06-18 DIAGNOSIS — I10 ESSENTIAL HYPERTENSION: Chronic | ICD-10-CM

## 2024-06-18 DIAGNOSIS — Z00.00 ENCOUNTER FOR SUBSEQUENT ANNUAL WELLNESS VISIT (AWV) IN MEDICARE PATIENT: Primary | ICD-10-CM

## 2024-06-18 DIAGNOSIS — D64.9 ANEMIA, UNSPECIFIED TYPE: ICD-10-CM

## 2024-06-18 PROBLEM — D36.9 TUBULAR ADENOMA: Status: RESOLVED | Noted: 2019-10-07 | Resolved: 2024-06-18

## 2024-06-18 PROCEDURE — 3074F SYST BP LT 130 MM HG: CPT | Performed by: INTERNAL MEDICINE

## 2024-06-18 PROCEDURE — 1125F AMNT PAIN NOTED PAIN PRSNT: CPT | Performed by: INTERNAL MEDICINE

## 2024-06-18 PROCEDURE — 1160F RVW MEDS BY RX/DR IN RCRD: CPT | Performed by: INTERNAL MEDICINE

## 2024-06-18 PROCEDURE — 1159F MED LIST DOCD IN RCRD: CPT | Performed by: INTERNAL MEDICINE

## 2024-06-18 PROCEDURE — G0439 PPPS, SUBSEQ VISIT: HCPCS | Performed by: INTERNAL MEDICINE

## 2024-06-18 PROCEDURE — 3079F DIAST BP 80-89 MM HG: CPT | Performed by: INTERNAL MEDICINE

## 2024-06-18 PROCEDURE — 1170F FXNL STATUS ASSESSED: CPT | Performed by: INTERNAL MEDICINE

## 2024-06-18 PROCEDURE — 96160 PT-FOCUSED HLTH RISK ASSMT: CPT | Performed by: INTERNAL MEDICINE

## 2024-06-18 RX ORDER — METOPROLOL SUCCINATE 50 MG/1
50 TABLET, EXTENDED RELEASE ORAL DAILY
Qty: 90 TABLET | Refills: 1 | Status: SHIPPED | OUTPATIENT
Start: 2024-06-18

## 2024-06-18 RX ORDER — FOLIC ACID 1 MG/1
1 TABLET ORAL DAILY
Qty: 90 TABLET | Refills: 1 | Status: SHIPPED | OUTPATIENT
Start: 2024-06-18

## 2024-06-18 RX ORDER — AMLODIPINE BESYLATE 10 MG/1
10 TABLET ORAL DAILY
Qty: 90 TABLET | Refills: 3 | Status: SHIPPED | OUTPATIENT
Start: 2024-06-18

## 2024-06-18 NOTE — PROGRESS NOTES
The ABCs of the Annual Wellness Visit  Subsequent Medicare Wellness Visit    Subjective    Lynnette Wright is a 73 y.o. female who presents for a Subsequent Medicare Wellness Visit.    The following portions of the patient's history were reviewed and   updated as appropriate: allergies, current medications, past family history, past medical history, past social history, past surgical history, and problem list.    Compared to one year ago, the patient feels her physical   health is the same.    Compared to one year ago, the patient feels her mental   health is the same.    Recent Hospitalizations:  She was not admitted to the hospital during the last year.       Current Medical Providers:  Patient Care Team:  Jacob Marroquin MD as PCP - General (Internal Medicine)  Milly Morales MD as Consulting Physician (Obstetrics and Gynecology)  Penny Hernandez MD as Consulting Physician (Endocrinology)  Magan Pulido MD as Consulting Physician (Ophthalmology)  Torin Arguelles MD as Consulting Physician (Gastroenterology)    Outpatient Medications Prior to Visit   Medication Sig Dispense Refill    allopurinol (ZYLOPRIM) 300 MG tablet TAKE 1 TABLET EVERY DAY 90 tablet 3    atorvastatin (LIPITOR) 40 MG tablet TAKE 1 TABLET EVERY DAY 90 tablet 3    benazepril (LOTENSIN) 40 MG tablet TAKE 1 TABLET EVERY DAY 90 tablet 3    colchicine 0.6 MG tablet TAKE 2 TABLETS BY MOUTH THEN TAKE 1 TABLET BY MOUTH 1 HOUR LATER, THEN 1 TABLET EVERY DAY UNTIL SYMPTOMS RESOLVE 20 tablet 1    empagliflozin (JARDIANCE) 10 MG tablet tablet Take  by mouth.      hydroCHLOROthiazide 25 MG tablet TAKE 1 TABLET EVERY DAY 90 tablet 3    Insulin Aspart (NOVOLOG FLEXPEN SC) Inject  under the skin into the appropriate area as directed. 15 units in the am and 16u in the PM      insulin glargine (LANTUS, SEMGLEE) 100 UNIT/ML injection Inject  under the skin into the appropriate area as directed Daily. 20 units in the am and 56 units in the pm       "Insulin Pen Needle 31G X 5 MM misc To use with Lantus Solorstar once daily 100 each 3    Lancets 30G misc       levothyroxine (SYNTHROID, LEVOTHROID) 200 MCG tablet       Liraglutide (VICTOZA) 18 MG/3ML solution pen-injector injection Inject 1.2 mg under the skin into the appropriate area as directed Daily.      metFORMIN (GLUCOPHAGE) 500 MG tablet Take 1 tablet by mouth 2 (Two) Times a Day With Meals.      NovoLOG FlexPen 100 UNIT/ML solution pen-injector sc pen       TURMERIC PO Take  by mouth.      valsartan (DIOVAN) 320 MG tablet Take 1 tablet by mouth Daily.      vitamin B-12 (CYANOCOBALAMIN) 1000 MCG tablet Take 1 tablet by mouth Daily.      amLODIPine (NORVASC) 10 MG tablet Take 1 tablet by mouth Daily. 90 tablet 3    aspirin 325 MG tablet Take 1 tablet by mouth Daily.      folic acid (FOLVITE) 1 MG tablet Take 1 tablet by mouth Daily. 90 tablet 1    metoprolol succinate XL (TOPROL-XL) 50 MG 24 hr tablet Take 1 tablet by mouth Daily. 90 tablet 1     No facility-administered medications prior to visit.       No opioid medication identified on active medication list. I have reviewed chart for other potential  high risk medication/s and harmful drug interactions in the elderly.        Aspirin is on active medication list. Aspirin use is not indicated based on review of current medical condition/s. Risk of harm outweighs potential benefits. Patient instructed to discontinue this medication.  .      Patient Active Problem List   Diagnosis    Other specified hypothyroidism    Anemia    B12 deficiency    Essential hypertension    High cholesterol    Morbidly obese     Advance Care Planning   Advance Care Planning     Advance Directive is not on file.  ACP discussion was held with the patient during this visit. Patient has an advance directive (not in EMR), copy requested.     Objective    Vitals:    06/18/24 1107   BP: 112/82   Weight: 119 kg (263 lb 3.2 oz)   Height: 165.1 cm (65\")     Estimated body mass index is " "43.8 kg/m² as calculated from the following:    Height as of this encounter: 165.1 cm (65\").    Weight as of this encounter: 119 kg (263 lb 3.2 oz).           Does the patient have evidence of cognitive impairment? No          HEALTH RISK ASSESSMENT    Smoking Status:  Social History     Tobacco Use   Smoking Status Never    Passive exposure: Never   Smokeless Tobacco Never     Alcohol Consumption:  Social History     Substance and Sexual Activity   Alcohol Use Yes    Comment: 1-2 per month     Fall Risk Screen:    ANKITADI Fall Risk Assessment was completed, and patient is at MODERATE risk for falls. Assessment completed on:2024    Depression Screenin/18/2024    11:09 AM   PHQ-2/PHQ-9 Depression Screening   Little Interest or Pleasure in Doing Things 0-->not at all   Feeling Down, Depressed or Hopeless 0-->not at all   PHQ-9: Brief Depression Severity Measure Score 0       Health Habits and Functional and Cognitive Screenin/18/2024    11:11 AM   Functional & Cognitive Status   Do you have difficulty preparing food and eating? No   Do you have difficulty bathing yourself, getting dressed or grooming yourself? No   Do you have difficulty using the toilet? No   Do you have difficulty moving around from place to place? No   Do you have trouble with steps or getting out of a bed or a chair? No   Current Diet Well Balanced Diet   Dental Exam Up to date   Eye Exam Up to date   Exercise (times per week) 0 times per week   Current Exercises Include No Regular Exercise;Walking;House Cleaning   Do you need help using the phone?  No   Are you deaf or do you have serious difficulty hearing?  No   Do you need help to go to places out of walking distance? No   Do you need help shopping? No   Do you need help preparing meals?  No   Do you need help with housework?  No   Do you need help with laundry? No   Do you need help taking your medications? No   Do you need help managing money? No   Do you ever drive or " ride in a car without wearing a seat belt? No   Have you felt unusual stress, anger or loneliness in the last month? No   Who do you live with? Spouse   If you need help, do you have trouble finding someone available to you? No   Have you been bothered in the last four weeks by sexual problems? No   Do you have difficulty concentrating, remembering or making decisions? No       Age-appropriate Screening Schedule:  Refer to the list below for future screening recommendations based on patient's age, sex and/or medical conditions. Orders for these recommended tests are listed in the plan section. The patient has been provided with a written plan.    Health Maintenance   Topic Date Due    RSV Vaccine - Adults (1 - 1-dose 60+ series) Never done    TDAP/TD VACCINES (2 - Td or Tdap) 01/01/2023    COVID-19 Vaccine (4 - 2023-24 season) 09/01/2023    URINE MICROALBUMIN  05/08/2024    DIABETIC EYE EXAM  07/10/2024    DXA SCAN  01/13/2025 (Originally 11/30/2023)    INFLUENZA VACCINE  08/01/2024    BMI FOLLOWUP  10/25/2024    HEMOGLOBIN A1C  11/06/2024    LIPID PANEL  12/11/2024    ANNUAL WELLNESS VISIT  06/18/2025    DIABETIC FOOT EXAM  06/18/2025    MAMMOGRAM  11/08/2025    COLORECTAL CANCER SCREENING  11/21/2032    HEPATITIS C SCREENING  Completed    Pneumococcal Vaccine 65+  Completed    ZOSTER VACCINE  Completed                  CMS Preventative Services Quick Reference  Risk Factors Identified During Encounter  Immunizations Discussed/Encouraged: Tdap  The above risks/problems have been discussed with the patient.  Pertinent information has been shared with the patient in the After Visit Summary.  An After Visit Summary and PPPS were made available to the patient.    Follow Up:   Next Medicare Wellness visit to be scheduled in 1 year.       Additional E&M Note during same encounter follows:  Patient has multiple medical problems which are significant and separately identifiable that require additional work above and beyond  "the Medicare Wellness Visit.      Chief Complaint  Medicare Wellness-subsequent    Subjective        HPI  Lynnette Wright is also being seen today for AWV and HTN.     Endo decreased her levothyroxine in May to 200 mcgm daily.         Objective   Vital Signs:  /82   Ht 165.1 cm (65\")   Wt 119 kg (263 lb 3.2 oz)   BMI 43.80 kg/m²     Physical Exam  Vitals reviewed.   Constitutional:       Appearance: She is well-developed.   HENT:      Head: Normocephalic and atraumatic.   Neck:      Vascular: No carotid bruit.   Cardiovascular:      Rate and Rhythm: Normal rate and regular rhythm.      Heart sounds: Normal heart sounds, S1 normal and S2 normal.   Pulmonary:      Effort: Pulmonary effort is normal.      Breath sounds: Normal breath sounds.   Abdominal:      Palpations: Abdomen is soft. There is no mass.      Tenderness: There is no abdominal tenderness.   Feet:      Right foot:      Protective Sensation: 5 sites tested.  5 sites sensed.      Skin integrity: Skin integrity normal.      Left foot:      Protective Sensation: 5 sites tested.  5 sites sensed.      Skin integrity: Skin integrity normal.   Skin:     General: Skin is warm.   Neurological:      Mental Status: She is alert.   Psychiatric:         Behavior: Behavior normal.                         Assessment and Plan   Diagnoses and all orders for this visit:    1. Encounter for subsequent annual wellness visit (AWV) in Medicare patient (Primary)    2. Essential hypertension  -     amLODIPine (NORVASC) 10 MG tablet; Take 1 tablet by mouth Daily.  Dispense: 90 tablet; Refill: 3  -     metoprolol succinate XL (TOPROL-XL) 50 MG 24 hr tablet; Take 1 tablet by mouth Daily.  Dispense: 90 tablet; Refill: 1  -     Comprehensive Metabolic Panel; Future    3. High cholesterol  -     Lipid Panel With / Chol / HDL Ratio; Future    4. Other specified hypothyroidism  -     TSH; Future    5. Anemia, unspecified type  -     folic acid (FOLVITE) 1 MG tablet; Take 1 tablet " by mouth Daily.  Dispense: 90 tablet; Refill: 1  -     CBC & Differential; Future             Follow Up   Return in about 6 months (around 12/18/2024), or 30 minutes, for Lab Before FUP.  Patient was given instructions and counseling regarding her condition or for health maintenance advice. Please see specific information pulled into the AVS if appropriate.       TSH and BMP noted. Okay to stop asa. Recc Tdap. BP is good.

## 2024-07-22 ENCOUNTER — OFFICE VISIT (OUTPATIENT)
Dept: ORTHOPEDIC SURGERY | Facility: CLINIC | Age: 74
End: 2024-07-22
Payer: MEDICARE

## 2024-07-22 VITALS — BODY MASS INDEX: 44.32 KG/M2 | WEIGHT: 266 LBS | TEMPERATURE: 98.6 F | HEIGHT: 65 IN

## 2024-07-22 DIAGNOSIS — R52 PAIN: Primary | ICD-10-CM

## 2024-07-22 DIAGNOSIS — M17.12 ARTHRITIS OF LEFT KNEE: ICD-10-CM

## 2024-07-22 PROCEDURE — 73562 X-RAY EXAM OF KNEE 3: CPT | Performed by: NURSE PRACTITIONER

## 2024-07-22 PROCEDURE — 1159F MED LIST DOCD IN RCRD: CPT | Performed by: NURSE PRACTITIONER

## 2024-07-22 PROCEDURE — 20610 DRAIN/INJ JOINT/BURSA W/O US: CPT | Performed by: NURSE PRACTITIONER

## 2024-07-22 PROCEDURE — 1160F RVW MEDS BY RX/DR IN RCRD: CPT | Performed by: NURSE PRACTITIONER

## 2024-07-22 RX ORDER — NAPROXEN SODIUM 220 MG
220 TABLET ORAL 2 TIMES DAILY PRN
COMMUNITY

## 2024-07-22 RX ORDER — INSULIN GLARGINE 100 [IU]/ML
INJECTION, SOLUTION SUBCUTANEOUS
COMMUNITY
Start: 2024-07-01

## 2024-07-22 RX ORDER — METHYLPREDNISOLONE ACETATE 80 MG/ML
80 INJECTION, SUSPENSION INTRA-ARTICULAR; INTRALESIONAL; INTRAMUSCULAR; SOFT TISSUE
Status: COMPLETED | OUTPATIENT
Start: 2024-07-22 | End: 2024-07-22

## 2024-07-22 RX ORDER — LIDOCAINE HYDROCHLORIDE 20 MG/ML
2 INJECTION, SOLUTION EPIDURAL; INFILTRATION; INTRACAUDAL; PERINEURAL
Status: COMPLETED | OUTPATIENT
Start: 2024-07-22 | End: 2024-07-22

## 2024-07-22 RX ADMIN — LIDOCAINE HYDROCHLORIDE 2 ML: 20 INJECTION, SOLUTION EPIDURAL; INFILTRATION; INTRACAUDAL; PERINEURAL at 14:01

## 2024-07-22 RX ADMIN — METHYLPREDNISOLONE ACETATE 80 MG: 80 INJECTION, SUSPENSION INTRA-ARTICULAR; INTRALESIONAL; INTRAMUSCULAR; SOFT TISSUE at 14:01

## 2024-07-22 NOTE — PROGRESS NOTES
Lynnette Wright is here today for worsening Left knee pain. Knee injection was discussed with the patient in detail, including indication, risks, benefits, and alternatives. Verbal consent was given for the procedure. Injection site was aseptically prepared.    Radiology:   AP, lateral, 40 degree PA of the left knee obtained in the office today due to pain, with comparison views shows advanced tricompartmental degenerative changes, most significantly  medial and patellofemoral compartment with bone-on-bone contact, osteophyte formation and subchondral sclerosis      KNEE Injection Procedure Note:    Large Joint Arthrocentesis: L knee  Date/Time: 7/22/2024 2:01 PM  Consent given by: patient  Site marked: site marked  Timeout: Immediately prior to procedure a time out was called to verify the correct patient, procedure, equipment, support staff and site/side marked as required   Supporting Documentation  Indications: pain   Procedure Details  Location: knee - L knee  Preparation: Patient was prepped and draped in the usual sterile fashion  Needle gauge: 21G.  Medications administered: 80 mg methylPREDNISolone acetate 80 MG/ML; 2 mL lidocaine PF 2% 2 %  Patient tolerance: patient tolerated the procedure well with no immediate complications     Lynnette Wright tolerated the procedure well. A Bandage was applied to the injection site. At the conclusion of the injection I discussed the importance of continued quad strengthening exercises on a daily basis. I will see the patient back if the symptoms should fail to improve or worsen.    Karyn Chaves, DAQUAN  7/22/2024    Dictated Utilizing Dragon Dictation

## 2024-09-12 DIAGNOSIS — M10.9 ACUTE GOUT OF RIGHT FOOT, UNSPECIFIED CAUSE: ICD-10-CM

## 2024-09-12 RX ORDER — ALLOPURINOL 300 MG/1
TABLET ORAL
Qty: 90 TABLET | Refills: 3 | Status: SHIPPED | OUTPATIENT
Start: 2024-09-12

## 2024-09-26 DIAGNOSIS — M10.9 ACUTE GOUT OF RIGHT FOOT, UNSPECIFIED CAUSE: ICD-10-CM

## 2024-09-26 RX ORDER — COLCHICINE 0.6 MG/1
TABLET ORAL
Qty: 20 TABLET | Refills: 1 | Status: SHIPPED | OUTPATIENT
Start: 2024-09-26

## 2024-10-22 ENCOUNTER — OFFICE VISIT (OUTPATIENT)
Dept: ORTHOPEDIC SURGERY | Facility: CLINIC | Age: 74
End: 2024-10-22
Payer: MEDICARE

## 2024-10-22 VITALS — WEIGHT: 274 LBS | HEIGHT: 65 IN | TEMPERATURE: 98 F | BODY MASS INDEX: 45.65 KG/M2

## 2024-10-22 DIAGNOSIS — E66.01 MORBIDLY OBESE: ICD-10-CM

## 2024-10-22 DIAGNOSIS — M17.12 PRIMARY OSTEOARTHRITIS OF LEFT KNEE: Primary | ICD-10-CM

## 2024-10-22 RX ORDER — LIDOCAINE HYDROCHLORIDE 10 MG/ML
2 INJECTION, SOLUTION EPIDURAL; INFILTRATION; INTRACAUDAL; PERINEURAL
Status: COMPLETED | OUTPATIENT
Start: 2024-10-22 | End: 2024-10-22

## 2024-10-22 RX ORDER — METHYLPREDNISOLONE ACETATE 80 MG/ML
1 INJECTION, SUSPENSION INTRA-ARTICULAR; INTRALESIONAL; INTRAMUSCULAR; SOFT TISSUE
Status: COMPLETED | OUTPATIENT
Start: 2024-10-22 | End: 2024-10-22

## 2024-10-22 RX ADMIN — METHYLPREDNISOLONE ACETATE 1 ML: 80 INJECTION, SUSPENSION INTRA-ARTICULAR; INTRALESIONAL; INTRAMUSCULAR; SOFT TISSUE at 11:20

## 2024-10-22 RX ADMIN — LIDOCAINE HYDROCHLORIDE 2 ML: 10 INJECTION, SOLUTION EPIDURAL; INFILTRATION; INTRACAUDAL; PERINEURAL at 11:20

## 2024-10-22 NOTE — PROGRESS NOTES
Patient: Lynnette Wright  YOB: 1950  Date of Service: 10/22/2024    Chief Complaints:   Chief Complaint   Patient presents with    Left Knee - Pain       Subjective: Pt of [facility] here for pain in left knee that is worse and painful and she is trying to lose weight.     History of Present Illness: Pt is seen in the office today with complaints of   Chief Complaint   Patient presents with    Left Knee - Pain   .  KNEE: TIMING:  The pain is described as ACUTE on CHRONIC.  LOCATION: medial joint line tenderness. AGGRAVATING FACTORS:  Is worsened by prolonged standing, sitting, walking and squatting activities.  ASSOCIATED SYMPTOMS:  swelling, tenderness, and aching.    This problem is new to this examiner.     Allergies:   Allergies   Allergen Reactions    Contrast Dye (Echo Or Unknown Ct/Mr) Hives       Medications:   Home Medications:  Current Outpatient Medications on File Prior to Visit   Medication Sig    allopurinol (ZYLOPRIM) 300 MG tablet TAKE 1 TABLET EVERY DAY    amLODIPine (NORVASC) 10 MG tablet Take 1 tablet by mouth Daily.    atorvastatin (LIPITOR) 40 MG tablet TAKE 1 TABLET EVERY DAY    colchicine 0.6 MG tablet TAKE 2 TABLETS BY MOUTH THEN TAKE 1 TABLET BY MOUTH 1 HOUR LATER, THEN 1 TABLET EVERY DAY UNTIL SYMPTOMS RESOLVE    Diclofenac Sodium (VOLTAREN) 1 % gel gel Apply 4 g topically to the appropriate area as directed 4 (Four) Times a Day As Needed.    empagliflozin (JARDIANCE) 10 MG tablet tablet Take  by mouth.    folic acid (FOLVITE) 1 MG tablet Take 1 tablet by mouth Daily.    hydroCHLOROthiazide 25 MG tablet TAKE 1 TABLET EVERY DAY    Insulin Aspart (NOVOLOG FLEXPEN SC) Inject  under the skin into the appropriate area as directed. 15 units in the am and 16u in the PM    Insulin Pen Needle 31G X 5 MM misc To use with Lantus Solorstar once daily    Lancets 30G misc     Lantus SoloStar 100 UNIT/ML injection pen INJECT 80 UNITS UNDER THE SKIN NIGHTLY    levothyroxine (SYNTHROID, LEVOTHROID) 200  MCG tablet     Liraglutide (VICTOZA) 18 MG/3ML solution pen-injector injection Inject 1.2 mg under the skin into the appropriate area as directed Daily.    metoprolol succinate XL (TOPROL-XL) 50 MG 24 hr tablet Take 1 tablet by mouth Daily.    naproxen sodium (ALEVE) 220 MG tablet Take 1 tablet by mouth 2 (Two) Times a Day As Needed.    TURMERIC PO Take  by mouth.    valsartan (DIOVAN) 320 MG tablet Take 1 tablet by mouth Daily.    vitamin B-12 (CYANOCOBALAMIN) 1000 MCG tablet Take 1 tablet by mouth Daily.    benazepril (LOTENSIN) 40 MG tablet TAKE 1 TABLET EVERY DAY (Patient not taking: Reported on 10/22/2024)    insulin glargine (LANTUS, SEMGLEE) 100 UNIT/ML injection Inject  under the skin into the appropriate area as directed Daily. 20 units in the am and 56 units in the pm (Patient not taking: Reported on 10/22/2024)    metFORMIN (GLUCOPHAGE) 500 MG tablet Take 1 tablet by mouth 2 (Two) Times a Day With Meals. (Patient not taking: Reported on 10/22/2024)    NovoLOG FlexPen 100 UNIT/ML solution pen-injector sc pen  (Patient not taking: Reported on 10/22/2024)     No current facility-administered medications on file prior to visit.     Current Medications:  Scheduled Meds:  Continuous Infusions:No current facility-administered medications for this visit.    PRN Meds:.    I have reviewed the patient's medical history in detail and updated the computerized patient record.  Review and summarization of old records include:    Past Medical History:   Diagnosis Date    Diverticulosis     Fatty liver     Gout     Heart murmur     Hemangioma     Knee swelling 2010    LVH (left ventricular hypertrophy)     ELLA (obstructive sleep apnea)     Tubular adenoma 10/07/2019    Wrist sprain 5/17    Diagnosed as elbow        Past Surgical History:   Procedure Laterality Date    BREAST BIOPSY      BREAST LUMPECTOMY      BREAST SURGERY      CATARACT EXTRACTION      CHOLECYSTECTOMY      COLONOSCOPY  2013    COLONOSCOPY N/A 07/03/2019     Procedure: COLONOSCOPY to cecum with biopsy / polypectomy;  Surgeon: Royal Zuleta MD;  Location: [facility] ENDOSCOPY;  Service: General    COLONOSCOPY  11/21/2022    HYSTERECTOMY  1998    FOR BLEEDING    THYROID SURGERY      nodule removed left thyroid    THYROIDECTOMY  1998    total thyroidectomy        Social History     Occupational History    Not on file   Tobacco Use    Smoking status: Never     Passive exposure: Never    Smokeless tobacco: Never   Vaping Use    Vaping status: Never Used   Substance and Sexual Activity    Alcohol use: Yes     Comment: 1-2 per month    Drug use: Never    Sexual activity: Yes     Partners: Male     Birth control/protection: Post-menopausal, None, Hysterectomy      Social History     Social History Narrative    Not on file        Family History   Problem Relation Age of Onset    Hypertension Mother     Other Mother         fall    Hypertension Father     Bone cancer Father     Lung cancer Father     Diabetes Brother     Cancer Brother         sinus    Hypertension Maternal Grandmother     Stroke Maternal Grandmother     Dementia Paternal Grandmother     Alzheimer's disease Paternal Grandmother     Stroke Paternal Grandfather        ROS: 14 point review of systems was performed and was negative except for documented findings in HPI and today's encounter.     Allergies:   Allergies   Allergen Reactions    Contrast Dye (Echo Or Unknown Ct/Mr) Hives     Constitutional:  Denies fever, shaking or chills   Eyes:  Denies change in visual acuity   HENT:  Denies nasal congestion or sore throat   Respiratory:  Denies cough or shortness of breath   Cardiovascular:  Denies chest pain or severe LE edema   GI:  Denies abdominal pain, nausea, vomiting, bloody stools or diarrhea   Musculoskeletal:  Numbness, tingling, or loss of motor function only as noted above in history of present illness.  : Denies painful urination or hematuria  Integument:  Denies rash, lesion or ulceration    Neurologic:  Denies headache or focal weakness  Endocrine:  Denies lymphadenopathy  Psych:  Denies confusion or change in mental status   Hem:  Denies active bleeding      Physical Exam: 73 y.o. female  Wt Readings from Last 3 Encounters:   10/22/24 124 kg (274 lb)   07/22/24 121 kg (266 lb)   06/18/24 119 kg (263 lb 3.2 oz)       Body mass index is 45.6 kg/m².  Facility age limit for growth %ayan is 20 years.  Vitals:    10/22/24 1108   Temp: 98 °F (36.7 °C)     Vital signs reviewed.   General Appearance:    Alert, cooperative, in no acute distress                  Eyes: conjunctiva clear  ENT: external ears and nose atraumatic  CV: no peripheral edema  Resp: normal respiratory effort  Skin: no rashes or wounds; normal turgor  Psych: mood and affect appropriate  Lymph: no nodes appreciated  Neuro: gross sensation intact  Vascular:  Palpable peripheral pulse in noted extremity  Musculoskeletal Extremities: KNEE Exam: medial and lateral joint line tenderness with crepitation, synovitis, swelling, and joint effusion left knee.      Diagnostic Data:  Imaging done previously in the office and discussed with the patient:    Procedure:  Large Joint Arthrocentesis: L knee  Date/Time: 10/22/2024 11:20 AM  Consent given by: patient  Site marked: site marked  Timeout: Immediately prior to procedure a time out was called to verify the correct patient, procedure, equipment, support staff and site/side marked as required   Supporting Documentation  Indications: pain   Procedure Details  Location: knee - L knee  Preparation: Patient was prepped and draped in the usual sterile fashion  Needle gauge: 21G.  Medications administered: 1 mL methylPREDNISolone acetate 80 MG/ML; 2 mL lidocaine PF 1% 1 %  Patient tolerance: patient tolerated the procedure well with no immediate complications        Assessment:     ICD-10-CM ICD-9-CM   1. Primary osteoarthritis of left knee  M17.12 715.16   2. Morbidly obese  E66.01 278.01       Plan:    Follow up as indicated.  Ice, elevate, and rest as needed.  The diagnosis(es), natural history, pathophysiology and treatment for diagnosis(es) were discussed. Opportunity given and questions answered.  Biomechanics of pertinent body areas discussed.  When appropriate, the use of ambulatory aids discussed.  BMI:  The concept of BMI body mass index and its importance and implications discussed.    EXERCISES:  Advice on benefits of, and types of regular/moderate exercise pertaining to orthopedic diagnosis(es).  MEDICATIONS:  The risks, benefits, warnings,side effects and alternatives of medications discussed.  DIABETES:  Diabetic counseling and how it affects the diagnosis and treatment  Inflammation/pain control; with cold, heat, elevation and/or liniments discussed as appropriate  Cortisone Injection. See procedure note.  HOME EXERCISE/PT program encouraged  MEDICAL RECORDS reviewed from other provider(s) for past and current medical history pertinent to this complaint.    10/22/2024

## 2024-12-01 DIAGNOSIS — D64.9 ANEMIA, UNSPECIFIED TYPE: ICD-10-CM

## 2024-12-01 DIAGNOSIS — I10 ESSENTIAL HYPERTENSION: Chronic | ICD-10-CM

## 2024-12-02 RX ORDER — METOPROLOL SUCCINATE 50 MG/1
50 TABLET, EXTENDED RELEASE ORAL DAILY
Qty: 90 TABLET | Refills: 3 | Status: SHIPPED | OUTPATIENT
Start: 2024-12-02

## 2024-12-02 RX ORDER — FOLIC ACID 1 MG/1
1000 TABLET ORAL DAILY
Qty: 90 TABLET | Refills: 3 | Status: SHIPPED | OUTPATIENT
Start: 2024-12-02

## 2024-12-23 ENCOUNTER — OFFICE VISIT (OUTPATIENT)
Dept: INTERNAL MEDICINE | Facility: CLINIC | Age: 74
End: 2024-12-23
Payer: MEDICARE

## 2024-12-23 VITALS
HEIGHT: 65 IN | SYSTOLIC BLOOD PRESSURE: 150 MMHG | BODY MASS INDEX: 44.68 KG/M2 | DIASTOLIC BLOOD PRESSURE: 90 MMHG | WEIGHT: 268.2 LBS

## 2024-12-23 DIAGNOSIS — E03.8 OTHER SPECIFIED HYPOTHYROIDISM: ICD-10-CM

## 2024-12-23 DIAGNOSIS — E78.00 HIGH CHOLESTEROL: Chronic | ICD-10-CM

## 2024-12-23 DIAGNOSIS — I10 ESSENTIAL HYPERTENSION: Primary | Chronic | ICD-10-CM

## 2024-12-23 PROCEDURE — G2211 COMPLEX E/M VISIT ADD ON: HCPCS | Performed by: INTERNAL MEDICINE

## 2024-12-23 PROCEDURE — 3080F DIAST BP >= 90 MM HG: CPT | Performed by: INTERNAL MEDICINE

## 2024-12-23 PROCEDURE — 1125F AMNT PAIN NOTED PAIN PRSNT: CPT | Performed by: INTERNAL MEDICINE

## 2024-12-23 PROCEDURE — 1159F MED LIST DOCD IN RCRD: CPT | Performed by: INTERNAL MEDICINE

## 2024-12-23 PROCEDURE — 99214 OFFICE O/P EST MOD 30 MIN: CPT | Performed by: INTERNAL MEDICINE

## 2024-12-23 PROCEDURE — 3077F SYST BP >= 140 MM HG: CPT | Performed by: INTERNAL MEDICINE

## 2024-12-23 PROCEDURE — 1160F RVW MEDS BY RX/DR IN RCRD: CPT | Performed by: INTERNAL MEDICINE

## 2024-12-23 RX ORDER — OLMESARTAN MEDOXOMIL 40 MG/1
40 TABLET ORAL DAILY
Qty: 90 TABLET | Refills: 1 | Status: SHIPPED | OUTPATIENT
Start: 2024-12-23

## 2024-12-23 NOTE — PROGRESS NOTES
Subjective        Chief Complaint   Patient presents with    Hypothyroidism           Lynnette Wright is a 74 y.o. female who presents for    Patient Active Problem List   Diagnosis    Other specified hypothyroidism    Anemia    B12 deficiency    Essential hypertension    High cholesterol    Morbidly obese       History of Present Illness       She was taken off of benazepril with endo. Her Bp was 133/84 with endo last week. Denies chest pain or gout. Her med list does not show an ARB.  Allergies   Allergen Reactions    Contrast Dye (Echo Or Unknown Ct/Mr) Hives       Current Outpatient Medications on File Prior to Visit   Medication Sig Dispense Refill    allopurinol (ZYLOPRIM) 300 MG tablet TAKE 1 TABLET EVERY DAY 90 tablet 3    amLODIPine (NORVASC) 10 MG tablet Take 1 tablet by mouth Daily. 90 tablet 3    atorvastatin (LIPITOR) 40 MG tablet TAKE 1 TABLET EVERY DAY 90 tablet 3    colchicine 0.6 MG tablet TAKE 2 TABLETS BY MOUTH THEN TAKE 1 TABLET BY MOUTH 1 HOUR LATER, THEN 1 TABLET EVERY DAY UNTIL SYMPTOMS RESOLVE 20 tablet 1    Diclofenac Sodium (VOLTAREN) 1 % gel gel Apply 4 g topically to the appropriate area as directed 4 (Four) Times a Day As Needed.      empagliflozin (Jardiance) 10 MG tablet tablet Take 1 tablet by mouth Daily.      folic acid (FOLVITE) 1 MG tablet TAKE 1 TABLET EVERY DAY 90 tablet 3    hydroCHLOROthiazide 25 MG tablet TAKE 1 TABLET EVERY DAY 90 tablet 3    Insulin Aspart (NOVOLOG FLEXPEN SC) Inject  under the skin into the appropriate area as directed. 15 units in the am and 16u in the PM      insulin glargine (LANTUS, SEMGLEE) 100 UNIT/ML injection Inject  under the skin into the appropriate area as directed Daily. 20 units in the am and 56 units in the pm      Insulin Pen Needle 31G X 5 MM misc To use with Lantus Solorstar once daily 100 each 3    Lancets 30G misc       Lantus SoloStar 100 UNIT/ML injection pen INJECT 80 UNITS UNDER THE SKIN NIGHTLY      levothyroxine (SYNTHROID,  LEVOTHROID) 200 MCG tablet       Liraglutide (VICTOZA) 18 MG/3ML solution pen-injector injection Inject 1.2 mg under the skin into the appropriate area as directed Daily.      metFORMIN (GLUCOPHAGE) 500 MG tablet Take 1 tablet by mouth 2 (Two) Times a Day With Meals.      metoprolol succinate XL (TOPROL-XL) 50 MG 24 hr tablet TAKE 1 TABLET EVERY DAY 90 tablet 3    naproxen sodium (ALEVE) 220 MG tablet Take 1 tablet by mouth 2 (Two) Times a Day As Needed.      NovoLOG FlexPen 100 UNIT/ML solution pen-injector sc pen       TURMERIC PO Take  by mouth.      vitamin B-12 (CYANOCOBALAMIN) 1000 MCG tablet Take 1 tablet by mouth Daily.      [DISCONTINUED] empagliflozin (JARDIANCE) 10 MG tablet tablet Take  by mouth.      [DISCONTINUED] valsartan (DIOVAN) 320 MG tablet Take 1 tablet by mouth Daily.      [DISCONTINUED] benazepril (LOTENSIN) 40 MG tablet TAKE 1 TABLET EVERY DAY (Patient not taking: Reported on 12/23/2024) 90 tablet 3     No current facility-administered medications on file prior to visit.       Past Medical History:   Diagnosis Date    Diverticulosis     Fatty liver     Gout     Heart murmur     Hemangioma     Knee swelling 2010    LVH (left ventricular hypertrophy)     ELLA (obstructive sleep apnea)     Tubular adenoma 10/07/2019    Wrist sprain 5/17    Diagnosed as elbow       Past Surgical History:   Procedure Laterality Date    BREAST BIOPSY      BREAST LUMPECTOMY      BREAST SURGERY      CATARACT EXTRACTION      CHOLECYSTECTOMY      COLONOSCOPY  2013    COLONOSCOPY N/A 07/03/2019    Procedure: COLONOSCOPY to cecum with biopsy / polypectomy;  Surgeon: Royal Zuleta MD;  Location: [facility] ENDOSCOPY;  Service: General    COLONOSCOPY  11/21/2022    HYSTERECTOMY  1998    FOR BLEEDING    THYROID SURGERY      nodule removed left thyroid    THYROIDECTOMY  1998    total thyroidectomy       Family History   Problem Relation Age of Onset    Hypertension Mother     Other Mother         fall    Hypertension Father   "   Bone cancer Father     Lung cancer Father     Diabetes Brother     Cancer Brother         sinus    Hypertension Maternal Grandmother     Stroke Maternal Grandmother     Dementia Paternal Grandmother     Alzheimer's disease Paternal Grandmother     Stroke Paternal Grandfather        Social History     Socioeconomic History    Marital status:    Tobacco Use    Smoking status: Never     Passive exposure: Never    Smokeless tobacco: Never   Vaping Use    Vaping status: Never Used   Substance and Sexual Activity    Alcohol use: Yes     Comment: 1-2 per month    Drug use: Never    Sexual activity: Yes     Partners: Male     Birth control/protection: Post-menopausal, None, Hysterectomy           The following portions of the patient's history were reviewed and updated as appropriate: problem list, allergies, current medications, past medical history, past family history, past social history, and past surgical history.    Review of Systems    Immunization History   Administered Date(s) Administered    COVID-19 (MODERNA) 1st,2nd,3rd Dose Monovalent 01/22/2021, 02/19/2021, 09/04/2021    Fluad Quad 65+ 11/06/2023    Fluzone (or Fluarix & Flulaval for VFC) >6mos 10/22/2022    Fluzone High-Dose 65+YRS 10/25/2018, 10/03/2019, 10/12/2020, 11/20/2024    Fluzone High-Dose 65+yrs 10/16/2021, 10/22/2022    Hepatitis A 12/12/2017, 06/11/2018    Hepatitis B 10/07/2019    Hepatitis B Adult/Adolescent IM 12/12/2017, 06/11/2018    Pneumococcal Conjugate 13-Valent (PCV13) 05/18/2015    Pneumococcal Polysaccharide (PPSV23) 01/01/2011, 12/03/2020    Shingrix 01/20/2023, 11/06/2023    Tdap 01/01/2013    Zostavax 01/01/2012       Objective   Vitals:    12/23/24 0949   BP: 150/90   Weight: 122 kg (268 lb 3.2 oz)   Height: 165.1 cm (65\")     Body mass index is 44.63 kg/m².  Physical Exam  Vitals reviewed.   Constitutional:       Appearance: She is well-developed.   HENT:      Head: Normocephalic and atraumatic.   Cardiovascular:      " Rate and Rhythm: Normal rate and regular rhythm.      Heart sounds: Normal heart sounds, S1 normal and S2 normal.   Pulmonary:      Effort: Pulmonary effort is normal.      Breath sounds: Normal breath sounds.   Skin:     General: Skin is warm.   Neurological:      Mental Status: She is alert.   Psychiatric:         Behavior: Behavior normal.         Procedures    Assessment & Plan   Diagnoses and all orders for this visit:    1. Essential hypertension (Primary)  -     olmesartan (Benicar) 40 MG tablet; Take 1 tablet by mouth Daily.  Dispense: 90 tablet; Refill: 1  -     Basic Metabolic Panel; Future    2. Other specified hypothyroidism  -     TSH; Future    3. High cholesterol  Comments:  Check CMP, FLP and TSH today.               Check labs. If TSH still up then I will add back an additional dose of levoxyl 75 mcgm once per week (reports taken off 125  mcgm in May).    R/S ARB. It looks like from endo that she was on Benicar earlier in the year.     Recc Tdao and RSV    Return in about 4 months (around 4/23/2025), or 30 minutes, for Lab Today, Lab Before FUP.

## 2024-12-26 DIAGNOSIS — E03.8 OTHER SPECIFIED HYPOTHYROIDISM: ICD-10-CM

## 2024-12-26 DIAGNOSIS — E78.00 HIGH CHOLESTEROL: ICD-10-CM

## 2024-12-26 DIAGNOSIS — E03.8 OTHER SPECIFIED HYPOTHYROIDISM: Primary | ICD-10-CM

## 2024-12-26 RX ORDER — LEVOTHYROXINE SODIUM 75 UG/1
75 TABLET ORAL DAILY
Qty: 90 TABLET | Refills: 1 | Status: SHIPPED | OUTPATIENT
Start: 2024-12-26

## 2024-12-26 RX ORDER — ATORVASTATIN CALCIUM 80 MG/1
80 TABLET, FILM COATED ORAL DAILY
Qty: 90 TABLET | Refills: 3 | Status: SHIPPED | OUTPATIENT
Start: 2024-12-26 | End: 2024-12-26 | Stop reason: SDUPTHER

## 2024-12-26 RX ORDER — LEVOTHYROXINE SODIUM 75 UG/1
75 TABLET ORAL DAILY
Qty: 90 TABLET | Refills: 1 | Status: SHIPPED | OUTPATIENT
Start: 2024-12-26 | End: 2024-12-26 | Stop reason: SDUPTHER

## 2024-12-26 RX ORDER — ATORVASTATIN CALCIUM 80 MG/1
80 TABLET, FILM COATED ORAL DAILY
Qty: 90 TABLET | Refills: 3 | Status: SHIPPED | OUTPATIENT
Start: 2024-12-26

## 2025-01-09 ENCOUNTER — OFFICE VISIT (OUTPATIENT)
Dept: ORTHOPEDIC SURGERY | Facility: CLINIC | Age: 75
End: 2025-01-09
Payer: MEDICARE

## 2025-01-09 VITALS — TEMPERATURE: 98.2 F | WEIGHT: 265 LBS | HEIGHT: 65 IN | BODY MASS INDEX: 44.15 KG/M2

## 2025-01-09 DIAGNOSIS — M17.12 PRIMARY OSTEOARTHRITIS OF LEFT KNEE: Primary | ICD-10-CM

## 2025-01-09 RX ORDER — METHYLPREDNISOLONE ACETATE 80 MG/ML
80 INJECTION, SUSPENSION INTRA-ARTICULAR; INTRALESIONAL; INTRAMUSCULAR; SOFT TISSUE
Status: COMPLETED | OUTPATIENT
Start: 2025-01-09 | End: 2025-01-09

## 2025-01-09 RX ADMIN — METHYLPREDNISOLONE ACETATE 80 MG: 80 INJECTION, SUSPENSION INTRA-ARTICULAR; INTRALESIONAL; INTRAMUSCULAR; SOFT TISSUE at 14:14

## 2025-01-09 NOTE — PROGRESS NOTES
1/9/2025    Lynnette Wright is here today for worsening knee pain. Pt has undergone injection of the knee in the past with good resolution of symptoms. Pt is requesting a repeat injection.     KNEE Injection Procedure Note:    Large Joint Arthrocentesis: L knee  Date/Time: 1/9/2025 2:14 PM  Consent given by: patient  Site marked: site marked  Timeout: Immediately prior to procedure a time out was called to verify the correct patient, procedure, equipment, support staff and site/side marked as required   Supporting Documentation  Indications: pain and joint swelling   Procedure Details  Location: knee - L knee  Preparation: Patient was prepped and draped in the usual sterile fashion  Needle gauge: 21 G.  Approach: anterolateral  Medications administered: 2 mL lidocaine (cardiac); 80 mg methylPREDNISolone acetate 80 MG/ML  Patient tolerance: patient tolerated the procedure well with no immediate complications    Patient continues to work on weight loss she has stuck it only losing 20 pounds she would like to talk about a total knee arthroplasty a lot of her weight is in her abdomen we will get her set up with one of the knee surgeons to talk about goals and where she needs to be.  Remain active to follow her college-age granddaughters that are both playing competitive field hockey.  At the conclusion of the injection I discussed the importance of continued quad strengthening exercises on a daily basis. I will see the patient back if the symptoms should fail to improve or worsen.    Rimma Arguelles, APRN  1/9/2025

## 2025-04-18 ENCOUNTER — OFFICE VISIT (OUTPATIENT)
Dept: ORTHOPEDIC SURGERY | Facility: CLINIC | Age: 75
End: 2025-04-18
Payer: MEDICARE

## 2025-04-18 VITALS — TEMPERATURE: 98.6 F | BODY MASS INDEX: 42.32 KG/M2 | HEIGHT: 65 IN | WEIGHT: 254 LBS

## 2025-04-18 DIAGNOSIS — M17.12 ARTHRITIS OF LEFT KNEE: Primary | ICD-10-CM

## 2025-04-18 RX ORDER — TIRZEPATIDE 7.5 MG/.5ML
INJECTION, SOLUTION SUBCUTANEOUS
COMMUNITY
Start: 2025-03-14

## 2025-04-18 RX ORDER — LIDOCAINE HYDROCHLORIDE 10 MG/ML
2 INJECTION, SOLUTION EPIDURAL; INFILTRATION; INTRACAUDAL; PERINEURAL
Status: COMPLETED | OUTPATIENT
Start: 2025-04-18 | End: 2025-04-18

## 2025-04-18 RX ORDER — METHYLPREDNISOLONE ACETATE 80 MG/ML
80 INJECTION, SUSPENSION INTRA-ARTICULAR; INTRALESIONAL; INTRAMUSCULAR; SOFT TISSUE
Status: COMPLETED | OUTPATIENT
Start: 2025-04-18 | End: 2025-04-18

## 2025-04-18 RX ADMIN — LIDOCAINE HYDROCHLORIDE 2 ML: 10 INJECTION, SOLUTION EPIDURAL; INFILTRATION; INTRACAUDAL; PERINEURAL at 11:58

## 2025-04-18 RX ADMIN — METHYLPREDNISOLONE ACETATE 80 MG: 80 INJECTION, SUSPENSION INTRA-ARTICULAR; INTRALESIONAL; INTRAMUSCULAR; SOFT TISSUE at 11:58

## 2025-04-18 NOTE — PROGRESS NOTES
Patient:Lynnette Wright    YOB: 1950    Medical Record Number:[id number]    Chief Complaints:  Worsening left knee pain    History of Present Illness:     74 y.o. female patient who presents for.  She reports a 8 to 9-year history of progressively worsening pain and dysfunction.  Most of the pain seems to be medial.  She periodically gets injections which do seem to help.  Currently she is really only having mild pain.  She says that she spends her llanos in [place] and she just recently got back.  She spent a lot of time in her pool in [place] and she feels like it has helped her knee tremendously.  She has also been losing some weight and she feels like that has helped as well.  Of note, she is going to be moving to [place] in the near future.  She wants to continue conservative treatment for the knee and definitely with the plan that she will ultimately have surgery after she moves to [place].    Allergies:  Allergies   Allergen Reactions    Contrast Dye (Echo Or Unknown Ct/Mr) Hives       Home Medications:    Current Outpatient Medications:     allopurinol (ZYLOPRIM) 300 MG tablet, TAKE 1 TABLET EVERY DAY, Disp: 90 tablet, Rfl: 3    amLODIPine (NORVASC) 10 MG tablet, Take 1 tablet by mouth Daily., Disp: 90 tablet, Rfl: 3    atorvastatin (Lipitor) 80 MG tablet, Take 1 tablet by mouth Daily., Disp: 90 tablet, Rfl: 3    colchicine 0.6 MG tablet, TAKE 2 TABLETS BY MOUTH THEN TAKE 1 TABLET BY MOUTH 1 HOUR LATER, THEN 1 TABLET EVERY DAY UNTIL SYMPTOMS RESOLVE, Disp: 20 tablet, Rfl: 1    Diclofenac Sodium (VOLTAREN) 1 % gel gel, Apply 4 g topically to the appropriate area as directed 4 (Four) Times a Day As Needed., Disp: , Rfl:     empagliflozin (Jardiance) 10 MG tablet tablet, Take 1 tablet by mouth Daily., Disp: , Rfl:     folic acid (FOLVITE) 1 MG tablet, TAKE 1 TABLET EVERY DAY, Disp: 90 tablet, Rfl: 3    hydroCHLOROthiazide 25 MG tablet, TAKE 1 TABLET EVERY DAY, Disp: 90 tablet, Rfl: 3    Insulin  Aspart (NOVOLOG FLEXPEN SC), Inject  under the skin into the appropriate area as directed. 15 units in the am and 16u in the PM, Disp: , Rfl:     insulin glargine (LANTUS, SEMGLEE) 100 UNIT/ML injection, Inject  under the skin into the appropriate area as directed Daily. 20 units in the am and 56 units in the pm, Disp: , Rfl:     Insulin Pen Needle 31G X 5 MM misc, To use with Lantus Solorstar once daily, Disp: 100 each, Rfl: 3    Lancets 30G misc, , Disp: , Rfl:     Lantus SoloStar 100 UNIT/ML injection pen, INJECT 80 UNITS UNDER THE SKIN NIGHTLY, Disp: , Rfl:     levothyroxine (Synthroid) 75 MCG tablet, Take 1 tablet by mouth Daily., Disp: 90 tablet, Rfl: 1    levothyroxine (SYNTHROID, LEVOTHROID) 200 MCG tablet, , Disp: , Rfl:     metFORMIN (GLUCOPHAGE) 500 MG tablet, Take 1 tablet by mouth 2 (Two) Times a Day With Meals., Disp: , Rfl:     metoprolol succinate XL (TOPROL-XL) 50 MG 24 hr tablet, TAKE 1 TABLET EVERY DAY, Disp: 90 tablet, Rfl: 3    Mounjaro 7.5 MG/0.5ML solution auto-injector, Inject 7.5mg every 7 days, Disp: , Rfl:     naproxen sodium (ALEVE) 220 MG tablet, Take 1 tablet by mouth 2 (Two) Times a Day As Needed., Disp: , Rfl:     NovoLOG FlexPen 100 UNIT/ML solution pen-injector sc pen, , Disp: , Rfl:     olmesartan (Benicar) 40 MG tablet, Take 1 tablet by mouth Daily., Disp: 90 tablet, Rfl: 1    TURMERIC PO, Take  by mouth., Disp: , Rfl:     vitamin B-12 (CYANOCOBALAMIN) 1000 MCG tablet, Take 1 tablet by mouth Daily., Disp: , Rfl:     Liraglutide (VICTOZA) 18 MG/3ML solution pen-injector injection, Inject 1.2 mg under the skin into the appropriate area as directed Daily. (Patient not taking: Reported on 4/18/2025), Disp: , Rfl:     Past Medical History:   Diagnosis Date    Anemia     Arthritis 2010    Cataract     Surgey completed    Diabetes mellitus 2010    Type II    Diverticulosis     Fatty liver     Gout     Heart murmur     Hemangioma     Hypertension     Hypothyroidism     Half removed in  1998; remainder 2001    Knee swelling 2010    LVH (left ventricular hypertrophy)     Obesity     ELLA (obstructive sleep apnea)     Tubular adenoma 10/07/2019    Wrist sprain 5/17    Diagnosed as elbow       Past Surgical History:   Procedure Laterality Date    BREAST BIOPSY      BREAST LUMPECTOMY      BREAST SURGERY  1989    CATARACT EXTRACTION      CHOLECYSTECTOMY      COLONOSCOPY  2013    COLONOSCOPY N/A 07/03/2019    Procedure: COLONOSCOPY to cecum with biopsy / polypectomy;  Surgeon: Royal Zuleta MD;  Location: [facility] ENDOSCOPY;  Service: General    COLONOSCOPY  11/21/2022    HYSTERECTOMY  1998    FOR BLEEDING    THYROID SURGERY      nodule removed left thyroid    THYROIDECTOMY  1998    total thyroidectomy       Social History     Occupational History    Not on file   Tobacco Use    Smoking status: Never     Passive exposure: Never    Smokeless tobacco: Never   Vaping Use    Vaping status: Never Used   Substance and Sexual Activity    Alcohol use: Yes     Comment: 1-2 per month    Drug use: Never    Sexual activity: Yes     Partners: Male     Birth control/protection: Post-menopausal, None, Hysterectomy      Social History     Social History Narrative    Not on file       Family History   Problem Relation Age of Onset    Hypertension Mother     Hypertension Father     Bone cancer Father     Lung cancer Father     Diabetes Brother     Cancer Brother         sinus    Hypertension Maternal Grandmother     Stroke Maternal Grandmother     Dementia Paternal Grandmother     Alzheimer's disease Paternal Grandmother     Stroke Paternal Grandfather        Review of Systems:      Constitutional: Denies fever, shaking or chills   Eyes: Denies change in visual acuity   HEENT: Denies nasal congestion or sore throat   Respiratory: Denies cough or shortness of breath   Cardiovascular: Denies chest pain or edema  Endocrine: Denies tremors, palpitations, intolerance of heat or cold, polyuria, polydipsia.  GI: Denies abdominal  "pain, nausea, vomiting, bloody stools or diarrhea  : Denies frequency, urgency, incontinence, retention, or nocturia.  Musculoskeletal: Denies numbness, tingling or loss of motor function except as above  Integument: Denies rash, lesion or ulceration   Neurologic: Denies headache or focal weakness, deficits  Heme: Denies spontaneous or excessive bleeding, epistaxis, hematuria, melena, fatigue, enlarged or tender lymph nodes.      All other pertinent positives and negatives as noted above in HPI.    Physical Exam:74 y.o. female  Vitals:    04/18/25 1134   Temp: 98.6 °F (37 °C)   Weight: 115 kg (254 lb)   Height: 165.1 cm (65\")     General:  Patient is awake and alert.  Appears in no acute distress or discomfort.    Psych:  Affect and demeanor are appropriate.    Extremities:  Left knee is examined.  Skin is benign.  No atrophy, swellings, or masses.  Focal tenderness along the medial joint line.  There is a trace effusion.  Knee motion: 2-110.  No instability.  Good strength with hip flexion, knee extension, ankle and great toe plantar flexion and dorsiflexion.  Sensation is intact distally.  Brisk capillary refill in the toes.  Palpable pedal pulses.  Good skin turgor.     Imaging: Her previous x-rays left knee are reviewed.  She appears to have advanced medial and patellofemoral compartment osteoarthritis with bone-on-bone, subchondral sclerosis and osteophyte formation.    Assessment/Plan:  Left knee osteoarthritis    We had a thorough discussion regarding her options including both surgical and nonsurgical treatment.  She elected to try another injection today.  The risk, benefits and alternatives to the procedure were discussed including her elevated risk with diabetes.  She consented and the procedure was performed as described below.  She will follow-up as needed.    James Ko MD    04/18/2025      Large Joint Arthrocentesis: L knee  Date/Time: 4/18/2025 11:58 AM  Consent given by: patient  Site " marked: site marked  Timeout: Immediately prior to procedure a time out was called to verify the correct patient, procedure, equipment, support staff and site/side marked as required   Supporting Documentation  Indications: pain and joint swelling   Procedure Details  Location: knee - L knee  Preparation: Patient was prepped and draped in the usual sterile fashion  Needle gauge: 21 G.  Approach: anterolateral  Medications administered: 80 mg methylPREDNISolone acetate 80 MG/ML; 2 mL lidocaine PF 1% 1 %  Patient tolerance: patient tolerated the procedure well with no immediate complications

## 2025-04-21 ENCOUNTER — TELEPHONE (OUTPATIENT)
Dept: INTERNAL MEDICINE | Facility: CLINIC | Age: 75
End: 2025-04-21
Payer: MEDICARE

## 2025-04-21 NOTE — TELEPHONE ENCOUNTER
Hub staff attempted to follow warm transfer process and was unsuccessful     Caller: Lynnette Wright    Relationship to patient: Self    Best call back number:      Patient is needing: CALL PATIENT TO RESCHEDULED 4/29 LAB VISIT.

## 2025-05-12 ENCOUNTER — APPOINTMENT (OUTPATIENT)
Dept: WOMENS IMAGING | Facility: HOSPITAL | Age: 75
End: 2025-05-12
Payer: MEDICARE

## 2025-05-12 PROCEDURE — 77067 SCR MAMMO BI INCL CAD: CPT | Performed by: RADIOLOGY

## 2025-05-12 PROCEDURE — 77063 BREAST TOMOSYNTHESIS BI: CPT | Performed by: RADIOLOGY

## 2025-06-03 ENCOUNTER — TELEPHONE (OUTPATIENT)
Dept: INTERNAL MEDICINE | Facility: CLINIC | Age: 75
End: 2025-06-03

## 2025-06-03 DIAGNOSIS — E78.00 HIGH CHOLESTEROL: ICD-10-CM

## 2025-06-03 DIAGNOSIS — I10 ESSENTIAL HYPERTENSION: Chronic | ICD-10-CM

## 2025-06-03 DIAGNOSIS — M10.9 ACUTE GOUT OF RIGHT FOOT, UNSPECIFIED CAUSE: ICD-10-CM

## 2025-06-03 DIAGNOSIS — D64.9 ANEMIA, UNSPECIFIED TYPE: ICD-10-CM

## 2025-06-03 RX ORDER — METOPROLOL SUCCINATE 50 MG/1
50 TABLET, EXTENDED RELEASE ORAL DAILY
Qty: 90 TABLET | Refills: 3 | Status: SHIPPED | OUTPATIENT
Start: 2025-06-03

## 2025-06-03 RX ORDER — FOLIC ACID 1 MG/1
1000 TABLET ORAL DAILY
Qty: 90 TABLET | Refills: 3 | Status: SHIPPED | OUTPATIENT
Start: 2025-06-03

## 2025-06-03 RX ORDER — ATORVASTATIN CALCIUM 80 MG/1
80 TABLET, FILM COATED ORAL DAILY
Qty: 90 TABLET | Refills: 3 | Status: SHIPPED | OUTPATIENT
Start: 2025-06-03

## 2025-06-03 RX ORDER — COLCHICINE 0.6 MG/1
TABLET ORAL
Qty: 20 TABLET | Refills: 1 | Status: SHIPPED | OUTPATIENT
Start: 2025-06-03

## 2025-06-03 RX ORDER — NAPROXEN SODIUM 220 MG/1
220 TABLET, FILM COATED ORAL 2 TIMES DAILY PRN
OUTPATIENT
Start: 2025-06-03

## 2025-06-03 RX ORDER — HYDROCHLOROTHIAZIDE 25 MG/1
25 TABLET ORAL DAILY
Qty: 90 TABLET | Refills: 3 | Status: SHIPPED | OUTPATIENT
Start: 2025-06-03

## 2025-06-03 RX ORDER — AMLODIPINE BESYLATE 10 MG/1
10 TABLET ORAL DAILY
Qty: 90 TABLET | Refills: 3 | Status: SHIPPED | OUTPATIENT
Start: 2025-06-03

## 2025-06-03 RX ORDER — ALLOPURINOL 300 MG/1
300 TABLET ORAL DAILY
Qty: 90 TABLET | Refills: 3 | Status: SHIPPED | OUTPATIENT
Start: 2025-06-03

## 2025-06-03 NOTE — TELEPHONE ENCOUNTER
Caller: Lynnette Wright    Relationship to patient: Self    Best call back number: [phone]         Additional notes:PLEASE CANCEL UPCOMING LAB APPOINTMENT ON 7/15.  THE PATIENT IS MOVING OUT OF TOWN

## 2025-06-03 NOTE — TELEPHONE ENCOUNTER
PATIENT IS MOVING TO [place] AND WILL NEED TIME TO ESTABLISH CARE WITH ANOTHER PROVIDER.  SHE IS REQUESTING A 6 MONTH SUPPLY ON THESE MEDICATION      Caller: Lynnette Wright    Relationship: Self    Best call back number: [phone]     Requested Prescriptions:   Requested Prescriptions     Pending Prescriptions Disp Refills    allopurinol (ZYLOPRIM) 300 MG tablet 90 tablet 3     Sig: Take 1 tablet by mouth Daily.    amLODIPine (NORVASC) 10 MG tablet 90 tablet 3     Sig: Take 1 tablet by mouth Daily.    atorvastatin (Lipitor) 80 MG tablet 90 tablet 3     Sig: Take 1 tablet by mouth Daily.    colchicine 0.6 MG tablet 20 tablet 1     Sig: TAKE 2 TABLETS BY MOUTH THEN TAKE 1 TABLET BY MOUTH 1 HOUR LATER, THEN 1 TABLET EVERY DAY UNTIL SYMPTOMS RESOLVE    Diclofenac Sodium (VOLTAREN) 1 % gel gel       Sig: Apply 4 g topically to the appropriate area as directed 4 (Four) Times a Day As Needed.    folic acid (FOLVITE) 1 MG tablet 90 tablet 3     Sig: Take 1 tablet by mouth Daily.    hydroCHLOROthiazide 25 MG tablet 90 tablet 3     Sig: Take 1 tablet by mouth Daily.    metoprolol succinate XL (TOPROL-XL) 50 MG 24 hr tablet 90 tablet 3     Sig: Take 1 tablet by mouth Daily.    naproxen sodium (ALEVE) 220 MG tablet       Sig: Take 1 tablet by mouth 2 (Two) Times a Day As Needed.        Pharmacy where request should be sent: [facility] - [facility] [address] - [phone]  - [phone] FX     Last office visit with prescribing clinician: 12/23/2024   Last telemedicine visit with prescribing clinician: Visit date not found   Next office visit with prescribing clinician: 7/17/2025         Gaston Varma Rep   06/03/25 14:46 EDT

## 2025-06-25 DIAGNOSIS — I10 ESSENTIAL HYPERTENSION: Chronic | ICD-10-CM

## 2025-06-25 RX ORDER — OLMESARTAN MEDOXOMIL 40 MG/1
40 TABLET ORAL DAILY
Qty: 90 TABLET | Refills: 3 | Status: SHIPPED | OUTPATIENT
Start: 2025-06-25

## (undated) DEVICE — TUBING, SUCTION, 1/4" X 10', STRAIGHT: Brand: MEDLINE

## (undated) DEVICE — CANN NASL CO2 TRULINK W/O2 A/

## (undated) DEVICE — THE TORRENT IRRIGATION SCOPE CONNECTOR IS USED WITH THE TORRENT IRRIGATION TUBING TO PROVIDE IRRIGATION FLUIDS SUCH AS STERILE WATER DURING GASTROINTESTINAL ENDOSCOPIC PROCEDURES WHEN USED IN CONJUNCTION WITH AN IRRIGATION PUMP (OR ELECTROSURGICAL UNIT).: Brand: TORRENT

## (undated) DEVICE — SINGLE-USE BIOPSY FORCEPS: Brand: RADIAL JAW 4

## (undated) DEVICE — Device: Brand: DEFENDO AIR/WATER/SUCTION AND BIOPSY VALVE